# Patient Record
Sex: FEMALE | Race: WHITE | Employment: FULL TIME | URBAN - METROPOLITAN AREA
[De-identification: names, ages, dates, MRNs, and addresses within clinical notes are randomized per-mention and may not be internally consistent; named-entity substitution may affect disease eponyms.]

---

## 2019-06-05 ENCOUNTER — HOSPITAL ENCOUNTER (OUTPATIENT)
Age: 44
Discharge: HOME | End: 2019-06-07
Payer: COMMERCIAL

## 2019-06-05 PROCEDURE — 88305 TISSUE EXAM BY PATHOLOGIST: CPT

## 2020-08-18 NOTE — PROGRESS NOTES
pt. in [place]; unable to come to [facility] in a time for covid test; going to [employer] on [facility]; will fax results to JOHN

## 2020-08-19 RX ORDER — SODIUM CHLORIDE 0.9 % (FLUSH) 0.9 %
10 SYRINGE (ML) INJECTION PRN
Status: CANCELLED | OUTPATIENT
Start: 2020-08-21

## 2020-08-19 RX ORDER — SODIUM CHLORIDE, SODIUM LACTATE, POTASSIUM CHLORIDE, CALCIUM CHLORIDE 600; 310; 30; 20 MG/100ML; MG/100ML; MG/100ML; MG/100ML
INJECTION, SOLUTION INTRAVENOUS CONTINUOUS
Status: CANCELLED | OUTPATIENT
Start: 2020-08-21

## 2020-08-20 ENCOUNTER — ANESTHESIA EVENT (OUTPATIENT)
Dept: OPERATING ROOM | Age: 45
End: 2020-08-20
Payer: COMMERCIAL

## 2020-08-20 PROBLEM — N92.1 MENOMETRORRHAGIA: Status: ACTIVE | Noted: 2020-08-20

## 2020-08-21 ENCOUNTER — HOSPITAL ENCOUNTER (OUTPATIENT)
Age: 45
Setting detail: OUTPATIENT SURGERY
Discharge: HOME | End: 2020-08-21
Attending: OBSTETRICS & GYNECOLOGY | Admitting: OBSTETRICS & GYNECOLOGY
Payer: COMMERCIAL

## 2020-08-21 ENCOUNTER — ANESTHESIA (OUTPATIENT)
Dept: OPERATING ROOM | Age: 45
End: 2020-08-21
Payer: COMMERCIAL

## 2020-08-21 VITALS
SYSTOLIC BLOOD PRESSURE: 107 MMHG | WEIGHT: 180 LBS | OXYGEN SATURATION: 100 % | DIASTOLIC BLOOD PRESSURE: 67 MMHG | BODY MASS INDEX: 28.25 KG/M2 | HEIGHT: 67 IN | HEART RATE: 72 BPM | RESPIRATION RATE: 16 BRPM | TEMPERATURE: 96.9 F

## 2020-08-21 VITALS
SYSTOLIC BLOOD PRESSURE: 93 MMHG | OXYGEN SATURATION: 98 % | RESPIRATION RATE: 3 BRPM | DIASTOLIC BLOOD PRESSURE: 55 MMHG

## 2020-08-21 LAB
HCG QUALITATIVE: NEGATIVE
HCT VFR BLD CALC: 35.8 % (ref 34–48)
HEMOGLOBIN: 11.4 G/DL (ref 11.5–15.5)
MCH RBC QN AUTO: 29.2 PG (ref 26–35)
MCHC RBC AUTO-ENTMCNC: 31.8 % (ref 32–34.5)
MCV RBC AUTO: 91.6 FL (ref 80–99.9)
PDW BLD-RTO: 13.1 FL (ref 11.5–15)
PLATELET # BLD: 233 E9/L (ref 130–450)
PMV BLD AUTO: 10.7 FL (ref 7–12)
RBC # BLD: 3.91 E12/L (ref 3.5–5.5)
WBC # BLD: 6.1 E9/L (ref 4.5–11.5)

## 2020-08-21 PROCEDURE — 2580000003 HC RX 258: Performed by: OBSTETRICS & GYNECOLOGY

## 2020-08-21 PROCEDURE — 7100000001 HC PACU RECOVERY - ADDTL 15 MIN: Performed by: OBSTETRICS & GYNECOLOGY

## 2020-08-21 PROCEDURE — 3600000003 HC SURGERY LEVEL 3 BASE: Performed by: OBSTETRICS & GYNECOLOGY

## 2020-08-21 PROCEDURE — 3600000013 HC SURGERY LEVEL 3 ADDTL 15MIN: Performed by: OBSTETRICS & GYNECOLOGY

## 2020-08-21 PROCEDURE — 2709999900 HC NON-CHARGEABLE SUPPLY: Performed by: OBSTETRICS & GYNECOLOGY

## 2020-08-21 PROCEDURE — 85027 COMPLETE CBC AUTOMATED: CPT

## 2020-08-21 PROCEDURE — 3700000000 HC ANESTHESIA ATTENDED CARE: Performed by: OBSTETRICS & GYNECOLOGY

## 2020-08-21 PROCEDURE — 88305 TISSUE EXAM BY PATHOLOGIST: CPT

## 2020-08-21 PROCEDURE — 6360000002 HC RX W HCPCS

## 2020-08-21 PROCEDURE — 7100000011 HC PHASE II RECOVERY - ADDTL 15 MIN: Performed by: OBSTETRICS & GYNECOLOGY

## 2020-08-21 PROCEDURE — 3700000001 HC ADD 15 MINUTES (ANESTHESIA): Performed by: OBSTETRICS & GYNECOLOGY

## 2020-08-21 PROCEDURE — 36415 COLL VENOUS BLD VENIPUNCTURE: CPT

## 2020-08-21 PROCEDURE — 7100000000 HC PACU RECOVERY - FIRST 15 MIN: Performed by: OBSTETRICS & GYNECOLOGY

## 2020-08-21 PROCEDURE — 84703 CHORIONIC GONADOTROPIN ASSAY: CPT

## 2020-08-21 PROCEDURE — 7100000010 HC PHASE II RECOVERY - FIRST 15 MIN: Performed by: OBSTETRICS & GYNECOLOGY

## 2020-08-21 RX ORDER — SODIUM CHLORIDE 0.9 % (FLUSH) 0.9 %
10 SYRINGE (ML) INJECTION EVERY 12 HOURS SCHEDULED
Status: CANCELLED | OUTPATIENT
Start: 2020-08-21

## 2020-08-21 RX ORDER — SODIUM CHLORIDE 0.9 % (FLUSH) 0.9 %
10 SYRINGE (ML) INJECTION PRN
Status: DISCONTINUED | OUTPATIENT
Start: 2020-08-21 | End: 2020-08-21 | Stop reason: HOSPADM

## 2020-08-21 RX ORDER — ACETAMINOPHEN 325 MG/1
650 TABLET ORAL EVERY 4 HOURS PRN
Status: CANCELLED | OUTPATIENT
Start: 2020-08-21

## 2020-08-21 RX ORDER — OXYCODONE HYDROCHLORIDE AND ACETAMINOPHEN 5; 325 MG/1; MG/1
2 TABLET ORAL EVERY 4 HOURS PRN
Status: CANCELLED | OUTPATIENT
Start: 2020-08-21

## 2020-08-21 RX ORDER — MEPERIDINE HYDROCHLORIDE 25 MG/ML
12.5 INJECTION INTRAMUSCULAR; INTRAVENOUS; SUBCUTANEOUS EVERY 5 MIN PRN
Status: DISCONTINUED | OUTPATIENT
Start: 2020-08-21 | End: 2020-08-21 | Stop reason: HOSPADM

## 2020-08-21 RX ORDER — ONDANSETRON 2 MG/ML
INJECTION INTRAMUSCULAR; INTRAVENOUS PRN
Status: DISCONTINUED | OUTPATIENT
Start: 2020-08-21 | End: 2020-08-21 | Stop reason: SDUPTHER

## 2020-08-21 RX ORDER — MIDAZOLAM HYDROCHLORIDE 1 MG/ML
INJECTION INTRAMUSCULAR; INTRAVENOUS PRN
Status: DISCONTINUED | OUTPATIENT
Start: 2020-08-21 | End: 2020-08-21 | Stop reason: SDUPTHER

## 2020-08-21 RX ORDER — FENTANYL CITRATE 50 UG/ML
INJECTION, SOLUTION INTRAMUSCULAR; INTRAVENOUS PRN
Status: DISCONTINUED | OUTPATIENT
Start: 2020-08-21 | End: 2020-08-21 | Stop reason: SDUPTHER

## 2020-08-21 RX ORDER — SODIUM CHLORIDE 0.9 % (FLUSH) 0.9 %
10 SYRINGE (ML) INJECTION EVERY 12 HOURS SCHEDULED
Status: DISCONTINUED | OUTPATIENT
Start: 2020-08-21 | End: 2020-08-21 | Stop reason: HOSPADM

## 2020-08-21 RX ORDER — IBUPROFEN 600 MG/1
600 TABLET ORAL EVERY 6 HOURS PRN
Status: CANCELLED | OUTPATIENT
Start: 2020-08-21

## 2020-08-21 RX ORDER — SODIUM CHLORIDE 0.9 % (FLUSH) 0.9 %
10 SYRINGE (ML) INJECTION PRN
Status: CANCELLED | OUTPATIENT
Start: 2020-08-21

## 2020-08-21 RX ORDER — PROPOFOL 10 MG/ML
INJECTION, EMULSION INTRAVENOUS PRN
Status: DISCONTINUED | OUTPATIENT
Start: 2020-08-21 | End: 2020-08-21 | Stop reason: SDUPTHER

## 2020-08-21 RX ORDER — OXYCODONE HYDROCHLORIDE AND ACETAMINOPHEN 5; 325 MG/1; MG/1
1 TABLET ORAL EVERY 4 HOURS PRN
Status: CANCELLED | OUTPATIENT
Start: 2020-08-21

## 2020-08-21 RX ORDER — DEXAMETHASONE SODIUM PHOSPHATE 10 MG/ML
INJECTION, SOLUTION INTRAMUSCULAR; INTRAVENOUS PRN
Status: DISCONTINUED | OUTPATIENT
Start: 2020-08-21 | End: 2020-08-21 | Stop reason: SDUPTHER

## 2020-08-21 RX ORDER — LIDOCAINE HYDROCHLORIDE 20 MG/ML
INJECTION, SOLUTION INTRAVENOUS PRN
Status: DISCONTINUED | OUTPATIENT
Start: 2020-08-21 | End: 2020-08-21 | Stop reason: SDUPTHER

## 2020-08-21 RX ORDER — SODIUM CHLORIDE, SODIUM LACTATE, POTASSIUM CHLORIDE, CALCIUM CHLORIDE 600; 310; 30; 20 MG/100ML; MG/100ML; MG/100ML; MG/100ML
INJECTION, SOLUTION INTRAVENOUS CONTINUOUS
Status: DISCONTINUED | OUTPATIENT
Start: 2020-08-21 | End: 2020-08-21 | Stop reason: HOSPADM

## 2020-08-21 RX ADMIN — MIDAZOLAM 2 MG: 1 INJECTION INTRAMUSCULAR; INTRAVENOUS at 07:04

## 2020-08-21 RX ADMIN — FENTANYL CITRATE 50 MCG: 50 INJECTION, SOLUTION INTRAMUSCULAR; INTRAVENOUS at 07:17

## 2020-08-21 RX ADMIN — LIDOCAINE HYDROCHLORIDE 80 MG: 20 INJECTION, SOLUTION INTRAVENOUS at 07:08

## 2020-08-21 RX ADMIN — FENTANYL CITRATE 50 MCG: 50 INJECTION, SOLUTION INTRAMUSCULAR; INTRAVENOUS at 07:08

## 2020-08-21 RX ADMIN — ONDANSETRON 4 MG: 2 INJECTION INTRAMUSCULAR; INTRAVENOUS at 07:20

## 2020-08-21 RX ADMIN — PROPOFOL 200 MG: 10 INJECTION, EMULSION INTRAVENOUS at 07:08

## 2020-08-21 RX ADMIN — DEXAMETHASONE SODIUM PHOSPHATE 10 MG: 10 INJECTION, SOLUTION INTRAMUSCULAR; INTRAVENOUS at 07:08

## 2020-08-21 RX ADMIN — SODIUM CHLORIDE, POTASSIUM CHLORIDE, SODIUM LACTATE AND CALCIUM CHLORIDE: 600; 310; 30; 20 INJECTION, SOLUTION INTRAVENOUS at 05:55

## 2020-08-21 ASSESSMENT — PULMONARY FUNCTION TESTS
PIF_VALUE: 13
PIF_VALUE: 1
PIF_VALUE: 15
PIF_VALUE: 1
PIF_VALUE: 1
PIF_VALUE: 13
PIF_VALUE: 13
PIF_VALUE: 17
PIF_VALUE: 13
PIF_VALUE: 15
PIF_VALUE: 1
PIF_VALUE: 24
PIF_VALUE: 2
PIF_VALUE: 14
PIF_VALUE: 12
PIF_VALUE: 2
PIF_VALUE: 1
PIF_VALUE: 13
PIF_VALUE: 1
PIF_VALUE: 19
PIF_VALUE: 14
PIF_VALUE: 1
PIF_VALUE: 13
PIF_VALUE: 13
PIF_VALUE: 10
PIF_VALUE: 3
PIF_VALUE: 0
PIF_VALUE: 2
PIF_VALUE: 12
PIF_VALUE: 12
PIF_VALUE: 15

## 2020-08-21 ASSESSMENT — PAIN DESCRIPTION - PAIN TYPE
TYPE: SURGICAL PAIN
TYPE: SURGICAL PAIN

## 2020-08-21 ASSESSMENT — PAIN - FUNCTIONAL ASSESSMENT: PAIN_FUNCTIONAL_ASSESSMENT: 0-10

## 2020-08-21 ASSESSMENT — PAIN DESCRIPTION - LOCATION: LOCATION: VAGINA

## 2020-08-21 ASSESSMENT — PAIN SCALES - GENERAL
PAINLEVEL_OUTOF10: 0

## 2020-08-21 NOTE — ANESTHESIA PRE PROCEDURE
Department of Anesthesiology  Preprocedure Note       Name:  Al Mcduffie   Age:  39 y.o.  :  1975                                          MRN:  [id number]         Date:  2020      Surgeon: Alexa Stark):  Stan Padilla MD    Procedure: Procedure(s):  DILATATION AND CURETTAGE HYSTEROSCOPY    Medications prior to admission:   Prior to Admission medications    Medication Sig Start Date End Date Taking? Authorizing Provider   SYNTHROID 25 MCG tablet Take 25 mcg by mouth Daily  19   Historical Provider, MD   metFORMIN (GLUCOPHAGE-XR) 500 MG extended release tablet Take 500 mg by mouth daily (with breakfast)  19   Historical Provider, MD   Cholecalciferol (VITAMIN D3) 5000 units TABS Take by mouth daily     Historical Provider, MD       Current medications:    Current Facility-Administered Medications   Medication Dose Route Frequency Provider Last Rate Last Dose    lactated ringers infusion   Intravenous Continuous Wanda Quinones MD        sodium chloride flush 0.9 % injection 10 mL  10 mL Intravenous 2 times per day Wanda Quinones MD        sodium chloride flush 0.9 % injection 10 mL  10 mL Intravenous PRN Wanda Quinones MD           Allergies:     Allergies   Allergen Reactions    Naproxen      arrythmia       Problem List:    Patient Active Problem List   Diagnosis Code    Menometrorrhagia N92.1       Past Medical History:        Diagnosis Date    Atypical migraine     Fibromyalgia     Goiter     Menometrorrhagia 2020    PCOS (polycystic ovarian syndrome)        Past Surgical History:        Procedure Laterality Date    BLADDER REPAIR      BREAST SURGERY  2018    breast ductal excision       Social History:    Social History     Tobacco Use    Smoking status: Never Smoker    Smokeless tobacco: Never Used   Substance Use Topics    Alcohol use: Never     Frequency: Never                                Counseling given: Not Answered      Vital Signs (Current): Vitals:    08/19/20 1138 08/21/20 0530   BP:  103/65   Pulse:  72   Resp:  16   Temp:  98.2 °F (36.8 °C)   TempSrc:  Temporal   SpO2:  98%   Weight: 180 lb (81.6 kg)    Height: 5' 7\" (1.702 m)                                               BP Readings from Last 3 Encounters:   08/21/20 103/65   06/14/19 128/80   06/05/19 128/80       NPO Status: Time of last liquid consumption: 1900                        Time of last solid consumption: 1800                        Date of last liquid consumption: 08/20/20                        Date of last solid food consumption: 08/20/20    BMI:   Wt Readings from Last 3 Encounters:   08/19/20 180 lb (81.6 kg)   06/14/19 179 lb (81.2 kg)   06/05/19 179 lb (81.2 kg)     Body mass index is 28.19 kg/m². CBC:   Lab Results   Component Value Date    WBC 6.1 08/21/2020    RBC 3.91 08/21/2020    RBC 4.18 05/31/2019    HGB 11.4 08/21/2020    HCT 35.8 08/21/2020    MCV 91.6 08/21/2020    RDW 13.1 08/21/2020     08/21/2020       CMP: No results found for: NA, K, CL, CO2, BUN, CREATININE, GFRAA, AGRATIO, LABGLOM, GLUCOSE, PROT, CALCIUM, BILITOT, ALKPHOS, AST, ALT    POC Tests: No results for input(s): POCGLU, POCNA, POCK, POCCL, POCBUN, POCHEMO, POCHCT in the last 72 hours.     Coags: No results found for: PROTIME, INR, APTT    HCG (If Applicable):   Lab Results   Component Value Date    PREGTESTUR neg 06/06/2019    HCG <5 05/31/2019        ABGs: No results found for: PHART, PO2ART, WIO0UBM, XPE8NDG, BEART, R7QAXPEX     Type & Screen (If Applicable):  No results found for: LABABO, LABRH    Drug/Infectious Status (If Applicable):  No results found for: HIV, HEPCAB    COVID-19 Screening (If Applicable): No results found for: COVID19      Anesthesia Evaluation  Patient summary reviewed  Airway: Mallampati: II  TM distance: >3 FB     Mouth opening: > = 3 FB Dental:          Pulmonary:Negative Pulmonary ROS breath sounds clear to auscultation

## 2020-08-21 NOTE — H&P
nasal congestion  Hematological and Lymphatic ROS: negative for - blood clots, blood transfusions, bruising or fatigue  Endocrine ROS: negative for - malaise/lethargy, mood swings, palpitations or polydipsia/polyuria  Breast ROS: negative for - new or changing breast lumps or nipple changes  Respiratory ROS: negative for - sputum changes, stridor, tachypnea or wheezing  Cardiovascular ROS: negative for - irregular heartbeat, loss of consciousness, murmur or orthopnea  Gastrointestinal ROS: negative for - constipation, diarrhea, gas/bloating, heartburn or hematemesis  Genito-Urinary ROS: negative for -  genital discharge, genital ulcers or hematuria  Musculoskeletal ROS: negative for - gait disturbance, muscle pain or muscular weakness       PHYSICAL EXAM:    Vitals:  BP 96/66   Pulse 63   Temp 97.8 °F (36.6 °C) (Temporal)   Resp 9   Ht 5' 7\" (1.702 m)   Wt 180 lb (81.6 kg)   LMP 08/14/2020   SpO2 97%   BMI 28.19 kg/m²     General appearance:  NAD  Pyscho/social: neg for tremors and hallucinations  Head: NCAT, PERRLA, EOMI, red conjunctiva  Neck: supple, no masses  Lungs: CTAB, equal chest rise bilateral  Heart: Reg rate  Abdomen: soft, moderately tender in RUQ, nondistended  Skin; no lesions  Gu: no cva tenderness  Extremities: extremities normal, atraumatic, no cyanosis or edema    External Genitalia: General appearance; normal, Hair distribution; normal, Lesions absent  Urethral Meatus: Size normal, Location normal, Lesions absent, Prolapse absent  Vagina: General appearance normal, Estrogen effect normal, Discharge absent, Lesions absent, Pelvic support normal  Cervix: General appearance normal, Lesions absent, Discharge absent, Tenderness absent, Enlargement absent, Nodularity absent  Uterus:  Size normal, Contour normal, Position normal  Adenexa:  Masses absent, Tenderness absent    DATA:  Labs:    CBC:   Lab Results   Component Value Date    WBC 6.1 08/21/2020    RBC 3.91 08/21/2020    RBC 4.18 05/31/2019    HGB 11.4 08/21/2020    HCT 35.8 08/21/2020    MCV 91.6 08/21/2020    RDW 13.1 08/21/2020     08/21/2020       IMPRESSION/RECOMMENDATIONS:      Principal Problem:    Menometrorrhagia  Plan: Hysteroscopy D&C      The patient was counseled at length about the risks of mekhi Covid-19 during their perioperative period and any recovery window from their procedure. The patient was made aware that mekhi Covid-19  may worsen their prognosis for recovering from their procedure  and lend to a higher morbidity and/or mortality risk. All material risks, benefits, and reasonable alternatives including postponing the procedure were discussed. The patient does wish to proceed with the procedure at this time.

## 2020-08-21 NOTE — OP NOTE
PATIENT NAME:    Mariana Barrera    DATE OF PROCEDURE:                      8/21/2020  SURGEON:                                            Dani Westbrook M.D.   ASSISTANT:   PREOPERATIVE DIAGNOSIS:              Menometrorrhagia  POSTOPERATIVE DIAGNOSIS:            Same   OPERATION:                                          Hysteroscopy and D&C. ANESTHESIA:                                         General.   ESTIMATED BLOOD LOSS:                Minimal.   COMPLICATIONS:                                  None. PROCEDURE NOTE: With the patient in the supine position, general anesthesia was administered without any complications. The patient was then placed in dorsal lithotomy position using cane stirrups. The perineum was scrubbed and draped in the usual fashion. The bladder was catheterized, and clear looking urine was recovered. A heavy weighted retractor was placed in the vagina. The anterior lip of the cervix was grabbed by single-tooth tenaculum. The cervical os was dilated to allow the position of #16 Yudy Solid without any difficulty. A 5.5 mm diagnostic hysteroscope was introduced into the uterine cavity, and using normal saline as distending medium, diagnostic hysteroscopy was carried out. Thickened endometrium and normal tubal ostia were seen. The hysteroscope was then withdrawn. Next, sharp curettage of the endometrium was performed, and all tissues were submitted to pathology. Inspection revealed excellent hemostasis. The procedure was then terminated. All instruments were removed. The patient was placed in supine position, awakened from anesthesia and transferred to recovery room in good stable condition.        Active Hospital Problems    Diagnosis Date Noted    Menometrorrhagia [N92.1] 08/20/2020         Wanda Quinones  8/21/2020  7:57 AM

## 2020-08-21 NOTE — ANESTHESIA POSTPROCEDURE EVALUATION
Department of Anesthesiology  Postprocedure Note    Patient: Cristo Hannah  MRN: [id number]  YOB: 1975  Date of evaluation: 8/21/2020  Time:  9:24 AM     Procedure Summary     Date:  08/21/20 Room / Location:  [address] [place] [address] 03 / [address]    Anesthesia Start:  0703 Anesthesia Stop:  1717    Procedure:  DILATATION AND CURETTAGE HYSTEROSCOPY (N/A Uterus) Diagnosis:  (MENOMETRORRHAGIA)    Surgeon:  Chong Gonzalez MD Responsible Provider:  Estrella Hi MD    Anesthesia Type:  general ASA Status:  3          Anesthesia Type: general    Hung Phase I: Hung Score: 10    Hung Phase II: Hung Score: 10    Last vitals: Reviewed and per EMR flowsheets.        Anesthesia Post Evaluation    Patient location during evaluation: PACU  Patient participation: complete - patient participated  Level of consciousness: awake  Pain score: 0  Airway patency: patent  Nausea & Vomiting: no nausea  Complications: no  Cardiovascular status: blood pressure returned to baseline  Respiratory status: acceptable  Hydration status: euvolemic

## 2020-10-05 ENCOUNTER — HOSPITAL ENCOUNTER (OUTPATIENT)
Dept: PREADMISSION TESTING | Age: 45
Discharge: HOME | End: 2020-10-05
Payer: COMMERCIAL

## 2020-10-05 ENCOUNTER — HOSPITAL ENCOUNTER (OUTPATIENT)
Age: 45
Discharge: HOME | End: 2020-10-07
Payer: COMMERCIAL

## 2020-10-05 ENCOUNTER — HOSPITAL ENCOUNTER (OUTPATIENT)
Age: 45
Discharge: HOME | End: 2020-10-05
Payer: COMMERCIAL

## 2020-10-05 VITALS
HEIGHT: 67 IN | DIASTOLIC BLOOD PRESSURE: 80 MMHG | TEMPERATURE: 97.8 F | HEART RATE: 89 BPM | BODY MASS INDEX: 29.57 KG/M2 | RESPIRATION RATE: 16 BRPM | WEIGHT: 188.44 LBS | OXYGEN SATURATION: 97 % | SYSTOLIC BLOOD PRESSURE: 124 MMHG

## 2020-10-05 LAB
ABO/RH: NORMAL
ANTIBODY SCREEN: NORMAL
HCT VFR BLD CALC: 37.8 % (ref 34–48)
HEMOGLOBIN: 12.1 G/DL (ref 11.5–15.5)
MCH RBC QN AUTO: 29.2 PG (ref 26–35)
MCHC RBC AUTO-ENTMCNC: 32 % (ref 32–34.5)
MCV RBC AUTO: 91.1 FL (ref 80–99.9)
PDW BLD-RTO: 12.9 FL (ref 11.5–15)
PLATELET # BLD: 245 E9/L (ref 130–450)
PMV BLD AUTO: 10.7 FL (ref 7–12)
RBC # BLD: 4.15 E12/L (ref 3.5–5.5)
WBC # BLD: 7.2 E9/L (ref 4.5–11.5)

## 2020-10-05 PROCEDURE — 86850 RBC ANTIBODY SCREEN: CPT

## 2020-10-05 PROCEDURE — 86900 BLOOD TYPING SEROLOGIC ABO: CPT

## 2020-10-05 PROCEDURE — 36415 COLL VENOUS BLD VENIPUNCTURE: CPT

## 2020-10-05 PROCEDURE — 86901 BLOOD TYPING SEROLOGIC RH(D): CPT

## 2020-10-05 PROCEDURE — U0003 INFECTIOUS AGENT DETECTION BY NUCLEIC ACID (DNA OR RNA); SEVERE ACUTE RESPIRATORY SYNDROME CORONAVIRUS 2 (SARS-COV-2) (CORONAVIRUS DISEASE [COVID-19]), AMPLIFIED PROBE TECHNIQUE, MAKING USE OF HIGH THROUGHPUT TECHNOLOGIES AS DESCRIBED BY CMS-2020-01-R: HCPCS

## 2020-10-05 PROCEDURE — 85027 COMPLETE CBC AUTOMATED: CPT

## 2020-10-05 RX ORDER — ONABOTULINUMTOXINA 200 [USP'U]/1
200 INJECTION, POWDER, LYOPHILIZED, FOR SOLUTION INTRADERMAL; INTRAMUSCULAR
COMMUNITY
Start: 2020-09-09

## 2020-10-05 NOTE — PROGRESS NOTES
nail polish on your fingers or toes. 11. DO NOT wear any jewelry or piercings on day of surgery. All body piercing jewelry must be removed. 12. Shower the night before surgery with _x__Antibacterial soap /MARK WIPES________    13. TOTAL JOINT REPLACEMENT/HYSTERECTOMY PATIENTS ONLY---Remember to bring Blood Bank bracelet to the hospital on the day of surgery. 14. If you have a Living Will and Durable Power of  for Healthcare, please bring in a copy. 15. If appropriate bring crutches, inspirex, WALKER, CANE etc... 12. Notify your Surgeon if you develop any illness between now and surgery time, cough, cold, fever, sore throat, nausea, vomiting, etc.  Please notify your surgeon if you experience dizziness, shortness of breath or blurred vision between now & the time of your surgery. 17. If you have ___dentures, they will be removed before going to the OR; we will provide you a container. If you wear ___contact lenses or ___glasses, they will be removed; please bring a case for them. 18. To provide excellent care visitors will be limited to 2 in the room at any given time. 19. Please bring picture ID and insurance card. 20. Sleep apnea patients need to bring CPAP AND SETTINGS to hospital on day of surgery. 21. During flu season no children under the age of 15 are permitted in the hospital for the safety of all patients. 22. Other come in main lobby and stop at                  Please call AMBULATORY CARE if you have any further questions.    [facility]     [address]

## 2020-10-07 LAB
SARS-COV-2: NOT DETECTED
SOURCE: NORMAL

## 2020-10-09 ENCOUNTER — ANESTHESIA EVENT (OUTPATIENT)
Dept: OPERATING ROOM | Age: 45
End: 2020-10-09
Payer: COMMERCIAL

## 2020-10-09 ENCOUNTER — HOSPITAL ENCOUNTER (OUTPATIENT)
Age: 45
Setting detail: OBSERVATION
Discharge: HOME | End: 2020-10-09
Attending: OBSTETRICS & GYNECOLOGY | Admitting: OBSTETRICS & GYNECOLOGY
Payer: COMMERCIAL

## 2020-10-09 ENCOUNTER — ANESTHESIA (OUTPATIENT)
Dept: OPERATING ROOM | Age: 45
End: 2020-10-09
Payer: COMMERCIAL

## 2020-10-09 VITALS
DIASTOLIC BLOOD PRESSURE: 89 MMHG | SYSTOLIC BLOOD PRESSURE: 127 MMHG | RESPIRATION RATE: 2 BRPM | OXYGEN SATURATION: 100 % | TEMPERATURE: 95 F

## 2020-10-09 VITALS
SYSTOLIC BLOOD PRESSURE: 116 MMHG | DIASTOLIC BLOOD PRESSURE: 74 MMHG | RESPIRATION RATE: 15 BRPM | OXYGEN SATURATION: 99 % | HEART RATE: 91 BPM | TEMPERATURE: 98.5 F

## 2020-10-09 PROBLEM — Z98.890 POST-OPERATIVE STATE: Status: ACTIVE | Noted: 2020-10-09

## 2020-10-09 PROBLEM — Z90.710 HISTORY OF ROBOT-ASSISTED LAPAROSCOPIC HYSTERECTOMY: Status: ACTIVE | Noted: 2020-10-09

## 2020-10-09 PROBLEM — N85.2 HYPERTROPHY OF UTERUS: Status: ACTIVE | Noted: 2020-10-09

## 2020-10-09 LAB
HCG(URINE) PREGNANCY TEST: NEGATIVE
METER GLUCOSE: 100 MG/DL (ref 74–99)

## 2020-10-09 PROCEDURE — 3700000001 HC ADD 15 MINUTES (ANESTHESIA): Performed by: OBSTETRICS & GYNECOLOGY

## 2020-10-09 PROCEDURE — 88341 IMHCHEM/IMCYTCHM EA ADD ANTB: CPT

## 2020-10-09 PROCEDURE — S2900 ROBOTIC SURGICAL SYSTEM: HCPCS | Performed by: OBSTETRICS & GYNECOLOGY

## 2020-10-09 PROCEDURE — G0378 HOSPITAL OBSERVATION PER HR: HCPCS

## 2020-10-09 PROCEDURE — 81025 URINE PREGNANCY TEST: CPT

## 2020-10-09 PROCEDURE — 88342 IMHCHEM/IMCYTCHM 1ST ANTB: CPT

## 2020-10-09 PROCEDURE — 2500000003 HC RX 250 WO HCPCS: Performed by: NURSE ANESTHETIST, CERTIFIED REGISTERED

## 2020-10-09 PROCEDURE — 3700000000 HC ANESTHESIA ATTENDED CARE: Performed by: OBSTETRICS & GYNECOLOGY

## 2020-10-09 PROCEDURE — 2780000010 HC IMPLANT OTHER: Performed by: OBSTETRICS & GYNECOLOGY

## 2020-10-09 PROCEDURE — 7100000001 HC PACU RECOVERY - ADDTL 15 MIN: Performed by: OBSTETRICS & GYNECOLOGY

## 2020-10-09 PROCEDURE — 2580000003 HC RX 258: Performed by: OBSTETRICS & GYNECOLOGY

## 2020-10-09 PROCEDURE — 6360000002 HC RX W HCPCS: Performed by: NURSE ANESTHETIST, CERTIFIED REGISTERED

## 2020-10-09 PROCEDURE — 2709999900 HC NON-CHARGEABLE SUPPLY: Performed by: OBSTETRICS & GYNECOLOGY

## 2020-10-09 PROCEDURE — 7100000000 HC PACU RECOVERY - FIRST 15 MIN: Performed by: OBSTETRICS & GYNECOLOGY

## 2020-10-09 PROCEDURE — 2720000010 HC SURG SUPPLY STERILE: Performed by: OBSTETRICS & GYNECOLOGY

## 2020-10-09 PROCEDURE — 6360000002 HC RX W HCPCS: Performed by: OBSTETRICS & GYNECOLOGY

## 2020-10-09 PROCEDURE — 82962 GLUCOSE BLOOD TEST: CPT

## 2020-10-09 PROCEDURE — 6360000002 HC RX W HCPCS

## 2020-10-09 PROCEDURE — 3600000019 HC SURGERY ROBOT ADDTL 15MIN: Performed by: OBSTETRICS & GYNECOLOGY

## 2020-10-09 PROCEDURE — 3600000009 HC SURGERY ROBOT BASE: Performed by: OBSTETRICS & GYNECOLOGY

## 2020-10-09 PROCEDURE — 6370000000 HC RX 637 (ALT 250 FOR IP): Performed by: OBSTETRICS & GYNECOLOGY

## 2020-10-09 PROCEDURE — 88307 TISSUE EXAM BY PATHOLOGIST: CPT

## 2020-10-09 RX ORDER — PROPOFOL 10 MG/ML
INJECTION, EMULSION INTRAVENOUS PRN
Status: DISCONTINUED | OUTPATIENT
Start: 2020-10-09 | End: 2020-10-09 | Stop reason: SDUPTHER

## 2020-10-09 RX ORDER — SODIUM CHLORIDE 0.9 % (FLUSH) 0.9 %
10 SYRINGE (ML) INJECTION PRN
Status: DISCONTINUED | OUTPATIENT
Start: 2020-10-09 | End: 2020-10-10 | Stop reason: HOSPADM

## 2020-10-09 RX ORDER — LIDOCAINE HYDROCHLORIDE 20 MG/ML
INJECTION, SOLUTION INTRAVENOUS PRN
Status: DISCONTINUED | OUTPATIENT
Start: 2020-10-09 | End: 2020-10-09 | Stop reason: SDUPTHER

## 2020-10-09 RX ORDER — ONDANSETRON 2 MG/ML
4 INJECTION INTRAMUSCULAR; INTRAVENOUS
Status: DISCONTINUED | OUTPATIENT
Start: 2020-10-09 | End: 2020-10-09 | Stop reason: HOSPADM

## 2020-10-09 RX ORDER — GLYCOPYRROLATE 1 MG/5 ML
SYRINGE (ML) INTRAVENOUS PRN
Status: DISCONTINUED | OUTPATIENT
Start: 2020-10-09 | End: 2020-10-09 | Stop reason: SDUPTHER

## 2020-10-09 RX ORDER — SODIUM CHLORIDE 0.9 % (FLUSH) 0.9 %
10 SYRINGE (ML) INJECTION EVERY 12 HOURS SCHEDULED
Status: DISCONTINUED | OUTPATIENT
Start: 2020-10-09 | End: 2020-10-10 | Stop reason: HOSPADM

## 2020-10-09 RX ORDER — ONDANSETRON 2 MG/ML
INJECTION INTRAMUSCULAR; INTRAVENOUS PRN
Status: DISCONTINUED | OUTPATIENT
Start: 2020-10-09 | End: 2020-10-09 | Stop reason: SDUPTHER

## 2020-10-09 RX ORDER — MEPERIDINE HYDROCHLORIDE 25 MG/ML
12.5 INJECTION INTRAMUSCULAR; INTRAVENOUS; SUBCUTANEOUS EVERY 5 MIN PRN
Status: DISCONTINUED | OUTPATIENT
Start: 2020-10-09 | End: 2020-10-09 | Stop reason: HOSPADM

## 2020-10-09 RX ORDER — MEPERIDINE HYDROCHLORIDE 25 MG/ML
INJECTION INTRAMUSCULAR; INTRAVENOUS; SUBCUTANEOUS
Status: COMPLETED
Start: 2020-10-09 | End: 2020-10-09

## 2020-10-09 RX ORDER — KETOROLAC TROMETHAMINE 30 MG/ML
INJECTION, SOLUTION INTRAMUSCULAR; INTRAVENOUS PRN
Status: DISCONTINUED | OUTPATIENT
Start: 2020-10-09 | End: 2020-10-09 | Stop reason: SDUPTHER

## 2020-10-09 RX ORDER — SODIUM CHLORIDE 0.9 % (FLUSH) 0.9 %
10 SYRINGE (ML) INJECTION PRN
Status: DISCONTINUED | OUTPATIENT
Start: 2020-10-09 | End: 2020-10-09 | Stop reason: HOSPADM

## 2020-10-09 RX ORDER — SODIUM CHLORIDE 0.9 % (FLUSH) 0.9 %
10 SYRINGE (ML) INJECTION EVERY 12 HOURS SCHEDULED
Status: DISCONTINUED | OUTPATIENT
Start: 2020-10-09 | End: 2020-10-09 | Stop reason: HOSPADM

## 2020-10-09 RX ORDER — ROCURONIUM BROMIDE 10 MG/ML
INJECTION, SOLUTION INTRAVENOUS PRN
Status: DISCONTINUED | OUTPATIENT
Start: 2020-10-09 | End: 2020-10-09 | Stop reason: SDUPTHER

## 2020-10-09 RX ORDER — OXYCODONE HYDROCHLORIDE AND ACETAMINOPHEN 5; 325 MG/1; MG/1
1 TABLET ORAL EVERY 4 HOURS PRN
Status: DISCONTINUED | OUTPATIENT
Start: 2020-10-09 | End: 2020-10-10 | Stop reason: HOSPADM

## 2020-10-09 RX ORDER — NEOSTIGMINE METHYLSULFATE 1 MG/ML
INJECTION, SOLUTION INTRAVENOUS PRN
Status: DISCONTINUED | OUTPATIENT
Start: 2020-10-09 | End: 2020-10-09 | Stop reason: SDUPTHER

## 2020-10-09 RX ORDER — MIDAZOLAM HYDROCHLORIDE 1 MG/ML
INJECTION INTRAMUSCULAR; INTRAVENOUS PRN
Status: DISCONTINUED | OUTPATIENT
Start: 2020-10-09 | End: 2020-10-09 | Stop reason: SDUPTHER

## 2020-10-09 RX ORDER — FENTANYL CITRATE 50 UG/ML
INJECTION, SOLUTION INTRAMUSCULAR; INTRAVENOUS PRN
Status: DISCONTINUED | OUTPATIENT
Start: 2020-10-09 | End: 2020-10-09 | Stop reason: SDUPTHER

## 2020-10-09 RX ORDER — SODIUM CHLORIDE, SODIUM LACTATE, POTASSIUM CHLORIDE, CALCIUM CHLORIDE 600; 310; 30; 20 MG/100ML; MG/100ML; MG/100ML; MG/100ML
INJECTION, SOLUTION INTRAVENOUS CONTINUOUS
Status: DISCONTINUED | OUTPATIENT
Start: 2020-10-09 | End: 2020-10-10 | Stop reason: HOSPADM

## 2020-10-09 RX ORDER — OXYCODONE HYDROCHLORIDE AND ACETAMINOPHEN 5; 325 MG/1; MG/1
2 TABLET ORAL EVERY 4 HOURS PRN
Status: DISCONTINUED | OUTPATIENT
Start: 2020-10-09 | End: 2020-10-10 | Stop reason: HOSPADM

## 2020-10-09 RX ORDER — DEXAMETHASONE SODIUM PHOSPHATE 10 MG/ML
INJECTION, SOLUTION INTRAMUSCULAR; INTRAVENOUS PRN
Status: DISCONTINUED | OUTPATIENT
Start: 2020-10-09 | End: 2020-10-09 | Stop reason: SDUPTHER

## 2020-10-09 RX ORDER — ACETAMINOPHEN 325 MG/1
650 TABLET ORAL EVERY 4 HOURS PRN
Status: DISCONTINUED | OUTPATIENT
Start: 2020-10-09 | End: 2020-10-10 | Stop reason: HOSPADM

## 2020-10-09 RX ADMIN — FENTANYL CITRATE 50 MCG: 50 INJECTION, SOLUTION INTRAMUSCULAR; INTRAVENOUS at 08:30

## 2020-10-09 RX ADMIN — MEPERIDINE HYDROCHLORIDE 12.5 MG: 25 INJECTION INTRAMUSCULAR; INTRAVENOUS; SUBCUTANEOUS at 09:16

## 2020-10-09 RX ADMIN — PROPOFOL 190 MG: 10 INJECTION, EMULSION INTRAVENOUS at 07:19

## 2020-10-09 RX ADMIN — FENTANYL CITRATE 100 MCG: 50 INJECTION, SOLUTION INTRAMUSCULAR; INTRAVENOUS at 07:19

## 2020-10-09 RX ADMIN — FENTANYL CITRATE 50 MCG: 50 INJECTION, SOLUTION INTRAMUSCULAR; INTRAVENOUS at 07:43

## 2020-10-09 RX ADMIN — Medication 2 G: at 07:10

## 2020-10-09 RX ADMIN — SODIUM CHLORIDE, POTASSIUM CHLORIDE, SODIUM LACTATE AND CALCIUM CHLORIDE: 600; 310; 30; 20 INJECTION, SOLUTION INTRAVENOUS at 07:10

## 2020-10-09 RX ADMIN — Medication 0.6 MG: at 08:36

## 2020-10-09 RX ADMIN — MIDAZOLAM 2 MG: 1 INJECTION INTRAMUSCULAR; INTRAVENOUS at 07:09

## 2020-10-09 RX ADMIN — FENTANYL CITRATE 50 MCG: 50 INJECTION, SOLUTION INTRAMUSCULAR; INTRAVENOUS at 08:53

## 2020-10-09 RX ADMIN — ONDANSETRON 4 MG: 2 INJECTION INTRAMUSCULAR; INTRAVENOUS at 08:45

## 2020-10-09 RX ADMIN — ACETAMINOPHEN 650 MG: 325 TABLET, FILM COATED ORAL at 20:27

## 2020-10-09 RX ADMIN — DEXAMETHASONE SODIUM PHOSPHATE 10 MG: 10 INJECTION, SOLUTION INTRAMUSCULAR; INTRAVENOUS at 07:32

## 2020-10-09 RX ADMIN — ROCURONIUM BROMIDE 40 MG: 10 SOLUTION INTRAVENOUS at 07:19

## 2020-10-09 RX ADMIN — SODIUM CHLORIDE, POTASSIUM CHLORIDE, SODIUM LACTATE AND CALCIUM CHLORIDE: 600; 310; 30; 20 INJECTION, SOLUTION INTRAVENOUS at 06:08

## 2020-10-09 RX ADMIN — Medication 3 MG: at 08:36

## 2020-10-09 RX ADMIN — LIDOCAINE HYDROCHLORIDE 60 MG: 20 INJECTION, SOLUTION INTRAVENOUS at 07:19

## 2020-10-09 RX ADMIN — KETOROLAC TROMETHAMINE 30 MG: 30 INJECTION, SOLUTION INTRAMUSCULAR at 08:56

## 2020-10-09 ASSESSMENT — PULMONARY FUNCTION TESTS
PIF_VALUE: 29
PIF_VALUE: 15
PIF_VALUE: 28
PIF_VALUE: 23
PIF_VALUE: 27
PIF_VALUE: 19
PIF_VALUE: 19
PIF_VALUE: 28
PIF_VALUE: 14
PIF_VALUE: 19
PIF_VALUE: 14
PIF_VALUE: 1
PIF_VALUE: 28
PIF_VALUE: 16
PIF_VALUE: 28
PIF_VALUE: 28
PIF_VALUE: 27
PIF_VALUE: 28
PIF_VALUE: 20
PIF_VALUE: 7
PIF_VALUE: 28
PIF_VALUE: 29
PIF_VALUE: 1
PIF_VALUE: 28
PIF_VALUE: 15
PIF_VALUE: 16
PIF_VALUE: 1
PIF_VALUE: 2
PIF_VALUE: 15
PIF_VALUE: 20
PIF_VALUE: 15
PIF_VALUE: 15
PIF_VALUE: 28
PIF_VALUE: 18
PIF_VALUE: 22
PIF_VALUE: 27
PIF_VALUE: 14
PIF_VALUE: 29
PIF_VALUE: 29
PIF_VALUE: 27
PIF_VALUE: 2
PIF_VALUE: 5
PIF_VALUE: 23
PIF_VALUE: 3
PIF_VALUE: 17
PIF_VALUE: 29
PIF_VALUE: 14
PIF_VALUE: 28
PIF_VALUE: 21
PIF_VALUE: 25
PIF_VALUE: 15
PIF_VALUE: 28
PIF_VALUE: 1
PIF_VALUE: 16
PIF_VALUE: 28
PIF_VALUE: 17
PIF_VALUE: 27
PIF_VALUE: 29
PIF_VALUE: 28
PIF_VALUE: 28
PIF_VALUE: 29
PIF_VALUE: 15
PIF_VALUE: 28
PIF_VALUE: 5
PIF_VALUE: 28
PIF_VALUE: 17
PIF_VALUE: 17
PIF_VALUE: 14
PIF_VALUE: 28
PIF_VALUE: 27
PIF_VALUE: 17
PIF_VALUE: 20
PIF_VALUE: 28
PIF_VALUE: 15
PIF_VALUE: 28
PIF_VALUE: 29
PIF_VALUE: 29
PIF_VALUE: 15
PIF_VALUE: 23
PIF_VALUE: 0
PIF_VALUE: 15
PIF_VALUE: 29
PIF_VALUE: 15
PIF_VALUE: 1
PIF_VALUE: 17
PIF_VALUE: 12
PIF_VALUE: 16
PIF_VALUE: 1
PIF_VALUE: 28
PIF_VALUE: 28
PIF_VALUE: 27
PIF_VALUE: 17
PIF_VALUE: 28
PIF_VALUE: 1
PIF_VALUE: 27

## 2020-10-09 ASSESSMENT — PAIN DESCRIPTION - LOCATION
LOCATION: ABDOMEN
LOCATION: ABDOMEN

## 2020-10-09 ASSESSMENT — PAIN SCALES - GENERAL
PAINLEVEL_OUTOF10: 1
PAINLEVEL_OUTOF10: 3
PAINLEVEL_OUTOF10: 4
PAINLEVEL_OUTOF10: 0

## 2020-10-09 ASSESSMENT — PAIN - FUNCTIONAL ASSESSMENT
PAIN_FUNCTIONAL_ASSESSMENT: ACTIVITIES ARE NOT PREVENTED
PAIN_FUNCTIONAL_ASSESSMENT: 0-10

## 2020-10-09 ASSESSMENT — PAIN DESCRIPTION - FREQUENCY: FREQUENCY: CONTINUOUS

## 2020-10-09 ASSESSMENT — PAIN DESCRIPTION - ORIENTATION: ORIENTATION: MID;LOWER

## 2020-10-09 ASSESSMENT — PAIN DESCRIPTION - DESCRIPTORS
DESCRIPTORS: ACHING;DISCOMFORT;SORE
DESCRIPTORS: ACHING;CRAMPING

## 2020-10-09 ASSESSMENT — PAIN DESCRIPTION - ONSET: ONSET: GRADUAL

## 2020-10-09 ASSESSMENT — PAIN DESCRIPTION - PAIN TYPE
TYPE: SURGICAL PAIN
TYPE: SURGICAL PAIN

## 2020-10-09 ASSESSMENT — PAIN DESCRIPTION - PROGRESSION: CLINICAL_PROGRESSION: NOT CHANGED

## 2020-10-09 NOTE — PROGRESS NOTES
Assumed care of pt at this time post-op hysterectomy. Plan of care discussed. Pt verbalized understanding. C/O being slightly nauseated and requesting medication. Call placed to Doctor William. No other concerns expressed. Call light within reach.  Corwithpepe Ozuna at Bedside.

## 2020-10-09 NOTE — PROGRESS NOTES
Dr Manisha Olguin in to patients room patient expess desire to be discharged today. Dr responded it is a possibility after reassessment this evening.

## 2020-10-09 NOTE — OP NOTE
prepared, brought in at a left-side docking, and the robotic arms   were then docked, and then the camera was then docked. At this time, the robotic instruments were then placed. The 1-arm had the ProGrasp. The 3-arm had the vessel sealer , and the 4-arm had the Endo Abby . The surgeon then broke scrub and moved to the surgeon's cart, and a robotic   time-out was then performed. The operation was then begun. Using the vessel sealer, the right round ligament was then coapted and divided, and with the Endo Abby, the round ligament was then divided and the posterior peritoneum was taken down to the pelvic side wall. The retroperitoneum was then developed, and the right ureter was then identified directly in its retroperitoneal space. The right  tubo-ovarian ligament and remainder of the fallopian tube   were then coapted and divided with vessel sealer. The posterior peritoneum was then taken down to the level of the   uterine artery with the Endo Abby. Similar procedure was then performed   on the opposite side. Then on the patient's left a bladder flap was then begun. The anterior peritoneum was then mobilized, and the bladder well mobilized using the Endo Abby. All 3 arms were then used throughout the procedure, and the bladder was well mobilized off the cervix and down approximately 2-3 cm past the cup, and the vagina was well visualized. The   posterior peritoneum was then visualized for both uterine arteries. Both   uterine arteries were then skeletonized. With cephalad traction on the   manipulator, both uterine arteries were then skeletonized, coapted with the   Vessel sealer, and then divided with the Endo Abby. The anterior colpotomy was then performed along with a posterior colpotomy at 3 and 9 o'clock. The remaining portion was then coapted with bipolar energy. The specimen was then freed and then removed through the vagina.  Once the specimen was then freed and removed from the

## 2020-10-09 NOTE — ANESTHESIA PRE PROCEDURE
Department of Anesthesiology  Preprocedure Note       Name:  Francisco Javier Mendez   Age:  39 y.o.  :  1975                                          MRN:  [id number]         Date:  10/9/2020      Surgeon: Lydia Morataya):  Thi Marie MD    Procedure: Procedure(s):  ROBOTIC XI ASSISTED TOTAL LAPAROSCOPIC HYSTERECTOMY WITH BILATERAL SALPINGECTOMY    Medications prior to admission:   Prior to Admission medications    Medication Sig Start Date End Date Taking? Authorizing Provider   Multiple Vitamins-Minerals (MULTIVITAMIN ADULT PO) Take by mouth daily    Historical Provider, MD   BOTOX 200 units injection Inject 200 Units into the skin every 3 months For migraines 20   Historical Provider, MD   SYNTHROID 25 MCG tablet Take 25 mcg by mouth Daily  19   Historical Provider, MD   metFORMIN (GLUCOPHAGE-XR) 500 MG extended release tablet Take 500 mg by mouth daily (with breakfast) For polycystic ovarian disease, not DM 19   Historical Provider, MD   Cholecalciferol (VITAMIN D3) 5000 units TABS Take by mouth daily     Historical Provider, MD       Current medications:    No current facility-administered medications for this visit. No current outpatient medications on file. Facility-Administered Medications Ordered in Other Visits   Medication Dose Route Frequency Provider Last Rate Last Dose    lactated ringers infusion   Intravenous Continuous Amine PATRICIA Quinones  mL/hr at 10/09/20 0608      sodium chloride flush 0.9 % injection 10 mL  10 mL Intravenous 2 times per day Wanda Quinones MD        sodium chloride flush 0.9 % injection 10 mL  10 mL Intravenous PRN Wanda Quinones MD        ceFAZolin (ANCEF) 2 g in sterile water 20 mL IV syringe  2 g Intravenous On Call to MD Luís           Allergies: Allergies   Allergen Reactions    Naproxen      arrythmia    Food      Onions . garlic , severe bloating    Topamax [Topiramate] Other (See Comments)     Breast swelling    Adhesive Tape Rash       Problem List:    Patient Active Problem List   Diagnosis Code    Menometrorrhagia N92.1       Past Medical History:        Diagnosis Date    Atypical migraine     Fibromyalgia     Goiter     [place] of blood transfusion 1979    [place]il    Menometrorrhagia 8/20/2020    PCOS (polycystic ovarian syndrome)        Past Surgical History:        Procedure Laterality Date    BLADDER REPAIR      BREAST SURGERY  2018    breast ductal excision    DILATION AND CURETTAGE OF UTERUS N/A 8/21/2020    DILATATION AND CURETTAGE HYSTEROSCOPY performed by Nereida Siemens, MD at [facility] History:    Social History     Tobacco Use    Smoking status: Never Smoker    Smokeless tobacco: Never Used   Substance Use Topics    Alcohol use: Never     Frequency: Never                                Counseling given: Not Answered      Vital Signs (Current): There were no vitals filed for this visit. BP Readings from Last 3 Encounters:   10/09/20 99/65   10/05/20 124/80   08/21/20 (!) 93/55       NPO Status:                                                                                 BMI:   Wt Readings from Last 3 Encounters:   10/05/20 188 lb 7 oz (85.5 kg)   08/19/20 180 lb (81.6 kg)   06/14/19 179 lb (81.2 kg)     There is no height or weight on file to calculate BMI.    CBC:   Lab Results   Component Value Date    WBC 7.2 10/05/2020    RBC 4.15 10/05/2020    RBC 4.18 05/31/2019    HGB 12.1 10/05/2020    HCT 37.8 10/05/2020    MCV 91.1 10/05/2020    RDW 12.9 10/05/2020     10/05/2020       CMP: No results found for: NA, K, CL, CO2, BUN, CREATININE, GFRAA, AGRATIO, LABGLOM, GLUCOSE, PROT, CALCIUM, BILITOT, ALKPHOS, AST, ALT    POC Tests: No results for input(s): POCGLU, POCNA, POCK, POCCL, POCBUN, POCHEMO, POCHCT in the last 72 hours.     Coags: No results found for: PROTIME, INR, APTT    HCG (If Applicable):   Lab Results   Component Value Date    PREGTESTUR neg 06/06/2019    HCG <5 05/31/2019        ABGs: No results found for: PHART, PO2ART, AAC2SSY, GRP4YCX, BEART, I7VSSHQD     Type & Screen (If Applicable):  No results found for: LABABO, LABRH    Drug/Infectious Status (If Applicable):  No results found for: HIV, HEPCAB    COVID-19 Screening (If Applicable):   Lab Results   Component Value Date    COVID19 Not Detected 10/05/2020         Anesthesia Evaluation  Patient summary reviewed  Airway: Mallampati: II  TM distance: >3 FB     Mouth opening: > = 3 FB Dental:          Pulmonary:Negative Pulmonary ROS breath sounds clear to auscultation                             Cardiovascular:Negative CV ROS            Rhythm: regular  Rate: normal                    Neuro/Psych:   (+) neuromuscular disease:, headaches (Atypical Migraine.): migraine headaches,             GI/Hepatic/Renal: Neg GI/Hepatic/Renal ROS            Endo/Other:    (+) hypothyroidism::., .                  ROS comment: Polycystic ovarian syndrome. S/P Breast Ductal Excision. Goiter. Abdominal:           Vascular: negative vascular ROS. Anesthesia Plan      general     ASA 2       Induction: intravenous. BIS  MIPS: Postoperative opioids intended. Anesthetic plan and risks discussed with patient. Plan discussed with CRNA.                   Linnea Dubois MD   10/9/2020

## 2020-10-10 NOTE — PROGRESS NOTES
Dr. Sis Lewis called in and talked to myself and the patient. Orders received to discharge patient home at this time. Discharge instructions given to pt over the phone by Dr. Mal Michelle understanding.

## 2020-11-08 PROBLEM — Z98.890 POST-OPERATIVE STATE: Status: RESOLVED | Noted: 2020-10-09 | Resolved: 2020-11-08

## 2021-08-27 ENCOUNTER — OFFICE VISIT (OUTPATIENT)
Dept: SURGERY | Age: 46
End: 2021-08-27
Payer: COMMERCIAL

## 2021-08-27 VITALS
DIASTOLIC BLOOD PRESSURE: 82 MMHG | HEIGHT: 67 IN | OXYGEN SATURATION: 98 % | WEIGHT: 206.3 LBS | BODY MASS INDEX: 32.38 KG/M2 | TEMPERATURE: 97.3 F | SYSTOLIC BLOOD PRESSURE: 110 MMHG | HEART RATE: 103 BPM | RESPIRATION RATE: 16 BRPM

## 2021-08-27 DIAGNOSIS — G43.919 INTRACTABLE MIGRAINE WITHOUT STATUS MIGRAINOSUS, UNSPECIFIED MIGRAINE TYPE: Primary | ICD-10-CM

## 2021-08-27 PROCEDURE — 99203 OFFICE O/P NEW LOW 30 MIN: CPT | Performed by: PLASTIC SURGERY

## 2021-08-27 NOTE — PROGRESS NOTES
Department of Plastic Surgery - Adult  Attending Migraine Consult Note      CHIEF COMPLAINT:   Migraine Headache    History Obtained From:  patient    HISTORY OF PRESENT ILLNESS:                The patient states that they have been having migraines for 15 years. They state that their migraines have  gotten prossively worse over the past 15 years. They state that their migraines occur daily, everyday without botox injections and usually lasts all day, which last for 4 or more hours at a time. They admit to having a minimum of 25-30 headaches per month. They do  admit to having them multiple times per day. They do  admit to having auras which occur  before a migraine. The patient states that auras are associated with pain which can be unilateral or bilateral and come in the ( supraorbital, temporal, and occipital )  Areas which are They are pulsatile 95% of the time. The auras typically begin in a left lateral distribution. They state that they do  frequently need to miss work second to the limitations associated with their migraines. They are worsened by physical activity and they  have a difficult time working out physically. The patient states that they are currently under the care of a Neurologist and are currently not being prescribed medication. She states that once she strated that after having Botox injections for 2 years she never had any problems or need for abortive medications such as triptans. They typically use NSAIDS,  to treat her migraines. The patient states that with medication prior to their migraine they can have alleviation 0% of the time. They state they would like to avoid narcotic pain medication 2nd to the associated side effects. They have  had Botox injections in the past with 100% relief of symptoms.   They do not admit to having a history of nasal trauma in the past.             Past Medical History:    Past Medical History:   Diagnosis Date    Atypical migraine     Fibromyalgia  Goiter     History of blood transfusion 1979    [place] tonsil    Hypertrophy of uterus 10/9/2020    Menometrorrhagia 8/20/2020    PCOS (polycystic ovarian syndrome)      Past Surgical History:    Past Surgical History:   Procedure Laterality Date    BLADDER REPAIR      BREAST SURGERY  2018    breast ductal excision    DILATION AND CURETTAGE OF UTERUS N/A 8/21/2020    DILATATION AND CURETTAGE HYSTEROSCOPY performed by Darwin Eddy MD [address] Bilateral 10/9/2020    ROBOTIC XI ASSISTED TOTAL LAPAROSCOPIC HYSTERECTOMY WITH BILATERAL SALPINGECTOMY performed by Darwin Eddy MD at [address]     Current Medications:   No current facility-administered medications for this visit. Allergies:  Naproxen, Food, Topamax [topiramate], and Adhesive tape    Social History:   Social History     Socioeconomic History    Marital status:      Spouse name: Not on file    Number of children: Not on file    Years of education: Not on file    Highest education level: Not on file   Occupational History    Not on file   Tobacco Use    Smoking status: Never Smoker    Smokeless tobacco: Never Used   Vaping Use    Vaping Use: Never used   Substance and Sexual Activity    Alcohol use: Never    Drug use: Never    Sexual activity: Yes     Partners: Male   Other Topics Concern    Not on file   Social History Narrative    Not on file     Social Determinants of Health     Financial Resource Strain:     Difficulty of Paying Living Expenses:    Food Insecurity:     Worried About Running Out of Food in the Last Year:     [address] in the Last Year:    Transportation Needs:     Lack of Transportation (Medical):      Lack of Transportation (Non-Medical):    Physical Activity:     Days of Exercise per Week:     Minutes of Exercise per Session:    Stress:     Feeling of Stress :    Social Connections:     Frequency of Communication with Friends and Family:     Frequency of Social Gatherings with Friends and Family:     Attends Religion Services:     Active Member of Clubs or Organizations:     Attends Club or Organization Meetings:     Marital Status:    Intimate Partner Violence:     Fear of Current or Ex-Partner:     Emotionally Abused:     Physically Abused:     Sexually Abused:      Family History:   Family History   Problem Relation Age of Onset    Heart Disease Maternal Grandmother     Hypertension Maternal Grandmother     Diabetes Mother     Ovarian Cancer Maternal Aunt     No Known Problems Father        REVIEW OF SYSTEMS:    CONSTITUTIONAL:  negative for  fevers, chills, sweats and fatigue  EYES: negative for dipolpia or acute vision loss. RESPIRATORY:  negative for  dry cough, cough with sputum, dyspnea, wheezing and chest pain  CARDIOVASCULAR:  negative for  chest pain, dyspnea, palpitations, syncope  GASTROINTESTINAL:  negative for nausea, vomiting, change in bowel habits, diarrhea, constipation and abdominal pain  EXTREMITIES: negative for edema  MUSCULOSKELETAL: negative for muscle weakness  SKIN: negative for itching or rashes. BEHAVIOR/PSYCH:  negative for poor appetite, increased appetite, decreased sleep and poor concentration        PHYSICAL EXAM:      VITALS:  /82 (Site: Left Upper Arm, Position: Sitting, Cuff Size: Medium Adult)   Pulse 103   Temp 97.3 °F (36.3 °C) (Skin)   Resp 16   Ht 5' 7\" (1.702 m)   Wt 206 lb 4.8 oz (93.6 kg)   LMP 10/02/2020   SpO2 98%   Breastfeeding No Comment: hysterectomy in october 2020  BMI 32.31 kg/m²   CONSTITUTIONAL:  awake, alert, cooperative, no apparent distress, and appears stated age  EYES: PERRLA, EOMI, no signs of occular infection  LUNGS:  No increased work of breathing, good air exchange  CARDIOVASCULAR:  Normal apical impulse, regular rate and rhythm  EXTREMITIES: no signs of clubbing or cyanosis.   MUSCULOSKELETAL: negative for flaccid muscle tone or spastic movements. SKIN: gross examination reveals no signs of rashes, or diaphoresis. NEURO: Cranial nerves II-XII grossly intact. No signs of agitated mood. CBC:   Lab Results   Component Value Date    WBC 7.2 10/05/2020    RBC 4.15 10/05/2020    RBC 4.18 05/31/2019    HGB 12.1 10/05/2020    HCT 37.8 10/05/2020    MCV 91.1 10/05/2020    MCH 29.2 10/05/2020    MCHC 32.0 10/05/2020    RDW 12.9 10/05/2020     10/05/2020    MPV 10.7 10/05/2020     BMP:  No results found for: NA, K, CL, CO2, BUN, LABALBU, CREATININE, CALCIUM, GFRAA, LABGLOM, GLUCOSE  Hepatic Function Panel:  No results found for: ALKPHOS, ALT, AST, PROT, BILITOT, BILIDIR, IBILI, LABALBU     Data- Radiology Review:  None at this time    DATA:    Radiology Review:  None at this     IMPRESSION/RECOMMENDATIONS:        Migraine, symptomatic. As the patient has had a 2-year duration of Botox injections with relief of symptoms completely mitigating her need for p.o. abortive treatments I recommend she return to her regimen of Botox injections. Second to the patient's insurance limitations and having her provider move out of state I believe getting her back on with Botox injections at our office would benefit her greatly. Chronic migraine headaches, with aura, nonintractable, Greater than 15 per month lasting greater than 4 hours. Refractory to medication. Trigger sites identified as  (supraorbital, bilateral temporal, bilateral occipital/trapezial.)    Migraine log sheet given to patient today for documentation. Continue with migraine specific acute medications if indicated or as needed.         FU after completion of prior authorization  Gaby Keller MD

## 2021-09-29 ENCOUNTER — OFFICE VISIT (OUTPATIENT)
Dept: SURGERY | Age: 46
End: 2021-09-29
Payer: COMMERCIAL

## 2021-09-29 VITALS
DIASTOLIC BLOOD PRESSURE: 68 MMHG | SYSTOLIC BLOOD PRESSURE: 100 MMHG | HEART RATE: 75 BPM | TEMPERATURE: 98.1 F | HEIGHT: 67 IN | WEIGHT: 200 LBS | BODY MASS INDEX: 31.39 KG/M2 | RESPIRATION RATE: 20 BRPM | OXYGEN SATURATION: 96 %

## 2021-09-29 DIAGNOSIS — L98.9 LESION OF SKIN OF FACE: Primary | ICD-10-CM

## 2021-09-29 PROCEDURE — 11102 TANGNTL BX SKIN SINGLE LES: CPT | Performed by: PLASTIC SURGERY

## 2021-09-29 PROCEDURE — 64615 CHEMODENERV MUSC MIGRAINE: CPT | Performed by: PLASTIC SURGERY

## 2021-09-29 PROCEDURE — 99213 OFFICE O/P EST LOW 20 MIN: CPT | Performed by: PLASTIC SURGERY

## 2021-09-29 RX ORDER — LIDOCAINE HYDROCHLORIDE AND EPINEPHRINE 10; 10 MG/ML; UG/ML
20 INJECTION, SOLUTION INFILTRATION; PERINEURAL ONCE
Status: COMPLETED | OUTPATIENT
Start: 2021-09-29 | End: 2021-09-29

## 2021-09-29 RX ADMIN — LIDOCAINE HYDROCHLORIDE AND EPINEPHRINE 20 ML: 10; 10 INJECTION, SOLUTION INFILTRATION; PERINEURAL at 13:40

## 2021-09-29 NOTE — PROGRESS NOTES
Medical Botox CGR#[id number] exp 01/2024    Bacteriostatic 0.9% Sodium Chloride   lot# [id number]            Expiration:    [id number]              TQI:[id number]

## 2021-10-11 ENCOUNTER — VIRTUAL VISIT (OUTPATIENT)
Dept: SURGERY | Age: 46
End: 2021-10-11

## 2021-10-11 DIAGNOSIS — L91.0 HYPERTROPHIC SCAR: Primary | ICD-10-CM

## 2021-10-11 PROCEDURE — 99999 PR OFFICE/OUTPT VISIT,PROCEDURE ONLY: CPT | Performed by: PHYSICIAN ASSISTANT

## 2021-10-12 NOTE — PROGRESS NOTES
oLrne Quiroga is a 42 y.o. female evaluated via telephone on 10/11/2021. Consent:  She and/or health care decision maker is aware that that she may receive a bill for this telephone service, depending on her insurance coverage, and has provided verbal consent to proceed: Yes    The patient was in the state of [place] during the entirety of the phone conversation. Documentation:  I communicated with the patient and/or health care decision maker about the pathology results from their most recent biopsy. Details of this discussion including any medical advice provided:     Pathology [address] [id number]       [address] [place] [address] 27     [address] [place]            [address]                                                   [address] [facility], [address] [facility], [address] [place] [address]               FINAL SURGICAL PATHOLOGY REPORT     NAME:           Sandra Rodriguez       Date of       09/29/2021                                            Collection:   Medical Record   [id number]              Date of       09/30/2021   Number:                                  Receipt:   Age:  38 Y        Sex:  F                Date          10/04/2021 11:13                                            Reported:   Date Of Birth:   1975   Financial        [id number]            Admitting     [employer]   Number:                                  Physician:   Patient          HEWSHANNA                   Ordering      SARAH ADDISON   Location:                                Physician:     Accession Number:  [id number]                                            Additional Physicians:ALICJA MINOR       Diagnosis:   Skin, right side of face: Focal dermal fibrosis consistent with cicatrix.

## 2021-10-13 NOTE — PROGRESS NOTES
Subjective: Follow up today from previous injections of Botox for migraine headache. presentation of symptomatic migraine headaches. Denies fever, nausea, vomiting, leg pain or swelling, pain is moderate to severe. The pt states that they have had continued migraine headaches over the past 3 months. The severity and frequency have decreased prior to beginning Botox. The patient states that they are still having greater than 15 days/month with headaches lasting greater than 4 hours or longer. They state that their migraines are still refractory to prescribed medications (triptans) and ibuprofen. The patient states they are refractory to both abortive and prophylactic medications. They present today to review migraine log and plan for continued botulinum toxin injection. They do  see a neurologist at this time. The patient states that their migraines have improved in severity and overall duration since the initial Botox injection. The patient also brings to my attention a lesion of the right face that began as a pigmented mole but lost its pigment since \"she started getting Botox\".     Objective:    /68 (Site: Left Upper Arm, Position: Sitting, Cuff Size: Medium Adult)   Pulse 75   Temp 98.1 °F (36.7 °C) (Skin)   Resp 20   Ht 5' 7\" (1.702 m)   Wt 200 lb (90.7 kg)   LMP 10/02/2020   SpO2 96%   BMI 31.32 kg/m²       Assessment:    CBC:   Lab Results   Component Value Date    WBC 7.2 10/05/2020    RBC 4.15 10/05/2020    RBC 4.18 05/31/2019    HGB 12.1 10/05/2020    HCT 37.8 10/05/2020    MCV 91.1 10/05/2020    MCH 29.2 10/05/2020    MCHC 32.0 10/05/2020    RDW 12.9 10/05/2020     10/05/2020    MPV 10.7 10/05/2020     BMP:  No results found for: NA, K, CL, CO2, BUN, LABALBU, CREATININE, CALCIUM, GFRAA, LABGLOM, GLUCOSE  Hepatic Function Panel:  No results found for: ALKPHOS, ALT, AST, PROT, BILITOT, BILIDIR, IBILI, LABALBU   Patient Active Problem List   Diagnosis    Menometrorrhagia    Hypertrophy of uterus    History of robot-assisted laparoscopic hysterectomy     Dynamic motion of the face in the frontal, glabellar, temple area. 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 Plan:     Shave biopsy indicated for an amelanotic lesion that has changed over time. The area was localized with 1% lidocaine with 1 100,000 epinephrine and allowed to work. A 15 blade was used to shave the lesion. Monsel solution was used for hemostasis. Bacitracin and a Band-Aid applied. Migraine sheet reviewed today with the patient over the past month. Patient suffers from significant migraine history with greater then 15 migraine occurrences throughout the month lasting for more than 4 hours that are still refractory to medication. The patient's trigger sites are identified ( Bilateralsupraorbital bilateral temporal, frontal  Recommend 75 units of Botox for continued symptomatic migraines        -  10 Units divided between 2 sites   Procerus- 5 units 1 site  Frontalis - 10 units divided between 4 sites  Temporalis- 40 units divided between 8 sites    Total Dose - 75 units       Migraine log sheet given to patient today for documentation. The patient will likely benefit from continued botulinum toxin injections to their trigger point sites to aid in alleviating the symptoms frequency and intensity and debilitating factors other migraine headaches. Botulinum Toxin Injection Procedure Note:        The risks, benefits and options were discussed with the pt. The risks included but not limited to pain, bleeding, infection, asymmetry,  and need for further procedures. The patient understands that there is a risk for upper eyelid ptosis. There may be a need for touch up injections and follow up at 2 weeks is encouraged. All of Her questions were answered to Her satisfaction and She agrees to proceed with the operation. The occiput and trapezial area was cleansed with alcohol.  Ice was used to numb the area. Botox was agreed upon and reconstituted in a concentration of 1 unit/ 0.01cc with sterile injectable saline. 75 units were injected into the areas. There was pinpoint bleeding and local redness. The patient tolerated the procedure well. There was 25 units Botox waste. The patient was counseled to use ice for the next hour and limit strenuous activity for 24 hours. Continue with PO migraine medication PRN. Follow up in 3 months        This document is generated, in part, by voice recognition software and thus  syntax and grammatical errors are possible.     Aide Roberts MD  9:06 AM  10/13/2021

## 2021-12-22 ENCOUNTER — OFFICE VISIT (OUTPATIENT)
Dept: SURGERY | Age: 46
End: 2021-12-22
Payer: COMMERCIAL

## 2021-12-22 VITALS — TEMPERATURE: 96.2 F

## 2021-12-22 DIAGNOSIS — G43.919 INTRACTABLE MIGRAINE WITHOUT STATUS MIGRAINOSUS, UNSPECIFIED MIGRAINE TYPE: Primary | ICD-10-CM

## 2021-12-22 PROCEDURE — 99213 OFFICE O/P EST LOW 20 MIN: CPT | Performed by: PHYSICIAN ASSISTANT

## 2021-12-22 PROCEDURE — 64615 CHEMODENERV MUSC MIGRAINE: CPT | Performed by: PHYSICIAN ASSISTANT

## 2021-12-22 NOTE — PROGRESS NOTES
Medical Botox-75 units  [facility] [id number]  LOT [id number]  EXP 01/2024    Bacteriostatic 0.9% Sodium chloride  LOT [id number]  EXP 01 OCT 2022  [facility] [id number]
trigger sites are identified ( Bilateralsupraorbital bilateral temporal, frontal  Recommend 75 units of Botox for continued symptomatic migraines        -  10 Units divided between 2 sites   Procerus- 5 units 1 site  Frontalis - 10 units divided between 4 sites  Temporalis- 40 units divided between 8 sites    Total Dose - 75 units     The patient will likely benefit from continued botulinum toxin injections to their trigger point sites to aid in alleviating the symptoms frequency and intensity and debilitating factors other migraine headaches. Botulinum Toxin Injection Procedure Note:        The risks, benefits and options were discussed with the pt. The risks included but not limited to pain, bleeding, infection, asymmetry,  and need for further procedures. The patient understands that there is a risk for upper eyelid ptosis. There may be a need for touch up injections and follow up at 2 weeks is encouraged. All of Her questions were answered to Her satisfaction and She agrees to proceed with the operation. Ice was used to numb the area. Botox was agreed upon and reconstituted in a concentration of 1 unit/ 0.01cc with sterile injectable saline. 75 units were injected into the areas. There was pinpoint bleeding and local redness. The patient tolerated the procedure well. There was 25 units Botox waste. The patient was counseled to use ice for the next hour and limit strenuous activity for 24 hours. Continue with PO migraine medication PRN. Follow up in 3 months    The patient's Botox was sent to our office by her pharmacy. This document is generated, in part, by voice recognition software and thus  syntax and grammatical errors are possible.     Javier Eaton  9:35 AM  12/22/2021

## 2022-03-14 NOTE — PROGRESS NOTES
Subjective: Follow up today from previous injections of Botox for migraine headache. presentation of symptomatic migraine headaches. Denies fever, nausea, vomiting, leg pain or swelling, pain is moderate to severe. The pt states that they have had continued migraine headaches over the past 3 months. The severity and frequency have decreased prior to beginning Botox. The patient states that they are still having greater than 15 days/month with headaches lasting greater than 4 hours or longer. They state that their migraines are still refractory to prescribed medications (triptans) and ibuprofen. The patient states they are refractory to both abortive and prophylactic medications. They present today to review migraine log and plan for continued botulinum toxin injection. They do  see a neurologist at this time. The patient states that their migraines have improved in severity and overall duration since the initial Botox injection. Objective:    LMP 10/02/2020       Assessment:    CBC:   Lab Results   Component Value Date    WBC 7.2 10/05/2020    RBC 4.15 10/05/2020    RBC 4.18 05/31/2019    HGB 12.1 10/05/2020    HCT 37.8 10/05/2020    MCV 91.1 10/05/2020    MCH 29.2 10/05/2020    MCHC 32.0 10/05/2020    RDW 12.9 10/05/2020     10/05/2020    MPV 10.7 10/05/2020     BMP:  No results found for: NA, K, CL, CO2, BUN, LABALBU, CREATININE, CALCIUM, GFRAA, LABGLOM, GLUCOSE, GLU  Hepatic Function Panel:  No results found for: ALKPHOS, ALT, AST, PROT, BILITOT, BILIDIR, IBILI, LABALBU   Patient Active Problem List   Diagnosis    Menometrorrhagia    Hypertrophy of uterus    History of robot-assisted laparoscopic hysterectomy     Dynamic motion of the face in the frontal, glabellar, temple area. 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 Plan:     Shave biopsy indicated for an amelanotic lesion that has changed over time.   The area was localized with 1% lidocaine with 1 100,000 epinephrine and allowed to work. A 15 blade was used to shave the lesion. Monsel solution was used for hemostasis. Bacitracin and a Band-Aid applied. Migraine sheet reviewed today with the patient over the past month. Patient suffers from significant migraine history with greater then 15 migraine occurrences throughout the month lasting for more than 4 hours that are still refractory to medication. The patient's trigger sites are identified ( Bilateralsupraorbital bilateral temporal, frontal  Recommend 75 units of Botox for continued symptomatic migraines        -  10 Units divided between 2 sites   Procerus- 5 units 1 site  Frontalis - 10 units divided between 4 sites  Temporalis- 40 units divided between 8 sites    Total Dose - 75 units       Migraine log sheet given to patient today for documentation. The patient will likely benefit from continued botulinum toxin injections to their trigger point sites to aid in alleviating the symptoms frequency and intensity and debilitating factors other migraine headaches. Botulinum Toxin Injection Procedure Note:        The risks, benefits and options were discussed with the pt. The risks included but not limited to pain, bleeding, infection, asymmetry,  and need for further procedures. The patient understands that there is a risk for upper eyelid ptosis. There may be a need for touch up injections and follow up at 2 weeks is encouraged. All of Her questions were answered to Her satisfaction and She agrees to proceed with the operation. The occiput and trapezial area was cleansed with alcohol. Ice was used to numb the area. Botox was agreed upon and reconstituted in a concentration of 1 unit/ 0.01cc with sterile injectable saline. 75 units were injected into the areas. There was pinpoint bleeding and local redness. The patient tolerated the procedure well. There was 25 units Botox waste.      The patient was counseled to use ice for the next hour and limit strenuous activity for 24 hours. Continue with PO migraine medication PRN.     Follow up in 3 months      Javier Oropeza

## 2022-03-18 ENCOUNTER — OFFICE VISIT (OUTPATIENT)
Dept: SURGERY | Age: 47
End: 2022-03-18
Payer: COMMERCIAL

## 2022-03-18 VITALS — TEMPERATURE: 97.9 F

## 2022-03-18 DIAGNOSIS — G43.919 INTRACTABLE MIGRAINE WITHOUT STATUS MIGRAINOSUS, UNSPECIFIED MIGRAINE TYPE: Primary | ICD-10-CM

## 2022-03-18 PROCEDURE — 99213 OFFICE O/P EST LOW 20 MIN: CPT | Performed by: PHYSICIAN ASSISTANT

## 2022-03-18 PROCEDURE — 64615 CHEMODENERV MUSC MIGRAINE: CPT | Performed by: PHYSICIAN ASSISTANT

## 2022-03-18 PROCEDURE — 11102 TANGNTL BX SKIN SINGLE LES: CPT | Performed by: PHYSICIAN ASSISTANT

## 2022-03-18 NOTE — PROGRESS NOTES
Medical Botox- 83 units  LOT [id number]  EXP 05/2024    Bacteriostatic 0.9% sodium chloride  LOT [id number]  EXP 01 AUG 2023  Nay[facility] 47 [id number]

## 2022-06-15 ENCOUNTER — OFFICE VISIT (OUTPATIENT)
Dept: SURGERY | Age: 47
End: 2022-06-15
Payer: COMMERCIAL

## 2022-06-15 VITALS — TEMPERATURE: 97.7 F

## 2022-06-15 DIAGNOSIS — G43.919 INTRACTABLE MIGRAINE WITHOUT STATUS MIGRAINOSUS, UNSPECIFIED MIGRAINE TYPE: Primary | ICD-10-CM

## 2022-06-15 PROCEDURE — 64615 CHEMODENERV MUSC MIGRAINE: CPT | Performed by: PHYSICIAN ASSISTANT

## 2022-06-15 PROCEDURE — 99213 OFFICE O/P EST LOW 20 MIN: CPT | Performed by: PHYSICIAN ASSISTANT

## 2022-06-15 NOTE — PROGRESS NOTES
Subjective: Follow up today from previous injections of Botox for migraine headache. presentation of symptomatic migraine headaches. Denies fever, nausea, vomiting, leg pain or swelling, pain is moderate to severe. The pt states that they have had continued migraine headaches over the past 3 months. The severity and frequency have decreased prior to beginning Botox. They state that their migraines are still refractory to prescribed medications (triptans) and ibuprofen. The patient states they are refractory to both abortive and prophylactic medications. They present today to review migraine log and plan for continued botulinum toxin injection. They do  see a neurologist at this time. The patient states that their migraines have improved in severity and overall duration since the initial Botox injection. She states she enjoyed the amount and location of her last Botox injections and is having continued wonderful results. Objective:    Temp 97.7 °F (36.5 °C) (Temporal)   LMP 10/02/2020   Breastfeeding No       Assessment:    CBC:   Lab Results   Component Value Date    WBC 7.2 10/05/2020    RBC 4.15 10/05/2020    RBC 4.18 05/31/2019    HGB 12.1 10/05/2020    HCT 37.8 10/05/2020    MCV 91.1 10/05/2020    MCH 29.2 10/05/2020    MCHC 32.0 10/05/2020    RDW 12.9 10/05/2020     10/05/2020    MPV 10.7 10/05/2020     BMP:  No results found for: NA, K, CL, CO2, BUN, LABALBU, CREATININE, CALCIUM, GFRAA, LABGLOM, GLUCOSE, GLU  Hepatic Function Panel:  No results found for: ALKPHOS, ALT, AST, PROT, BILITOT, BILIDIR, IBILI, LABALBU   Patient Active Problem List   Diagnosis    Menometrorrhagia    Hypertrophy of uterus    History of robot-assisted laparoscopic hysterectomy     Dynamic motion of the face in the frontal, glabellar, temple area. 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 Plan:       Migraine sheet reviewed today with the patient over the past month.  Patient suffers from significant migraine history with greater then 15 migraine occurrences throughout the month lasting for more than 4 hours that are still refractory to medication. The patient's trigger sites are identified ( Bilateralsupraorbital bilateral temporal, frontal  Recommend 75 units of Botox for continued symptomatic migraines        -  10 Units divided between 2 sites   Procerus- 5 units 1 site  Frontalis - 10 units divided between 4 sites  Temporalis- 40 units divided between 8 sites    Total Dose - 75 units       Migraine log sheet given to patient today for documentation. The patient will likely benefit from continued botulinum toxin injections to their trigger point sites to aid in alleviating the symptoms frequency and intensity and debilitating factors other migraine headaches. Botulinum Toxin Injection Procedure Note:        The risks, benefits and options were discussed with the pt. The risks included but not limited to pain, bleeding, infection, asymmetry,  and need for further procedures. The patient understands that there is a risk for upper eyelid ptosis. There may be a need for touch up injections and follow up at 2 weeks is encouraged. All of Her questions were answered to Her satisfaction and She agrees to proceed with the operation. The occiput and trapezial area was cleansed with alcohol. Ice was used to numb the area. Botox was agreed upon and reconstituted in a concentration of 1 unit/ 0.01cc with sterile injectable saline. 75 units were injected into the areas. There was pinpoint bleeding and local redness. The patient tolerated the procedure well. There was 25 units Botox waste. The patient was counseled to use ice for the next hour and limit strenuous activity for 24 hours. The patient's Botox was sent to our office by her pharmacy. Continue with PO migraine medication PRN.     Follow up in 3 months      [place], [place]

## 2022-06-15 NOTE — PROGRESS NOTES
Medical botox- DO NOT BILL-83 units  NDC 0284-0439-63  LOT [id number]  EXP 05/2024    Bacteriostatic 0.9% sodium chloride  LOT [id number]  EXP 10/23  Nayeli Perez 49 [phone]

## 2022-09-15 NOTE — PROGRESS NOTES
Subjective: Follow up today from previous injections of Botox for migraine headache. presentation of symptomatic migraine headaches. Denies fever, nausea, vomiting, leg pain or swelling, pain is moderate to severe. The pt states that they have had continued migraine headaches over the past 3 months. The severity and frequency have decreased prior to beginning Botox. They state that their migraines are still refractory to prescribed medications (triptans) and ibuprofen. The patient states they are refractory to both abortive and prophylactic medications. They present today to review migraine log and plan for continued botulinum toxin injection. They do not see a neurologist at this time as she states her migraines are worlds better with Botox alone and does not see a reason to follow with her neurologist currently. She states that the Botox injections have worked so well that she would prefer continuing with Botox and does not want to go back to neurologic treatments such as p.o medications for her migraines with a neurologist.  The patient states that their migraines have improved in severity and overall duration since the initial Botox injection. She states she enjoyed the amount and location of her last Botox injections and is having continued wonderful results.     Objective:    LMP 10/02/2020       Assessment:    CBC:   Lab Results   Component Value Date/Time    WBC 7.2 10/05/2020 01:55 PM    RBC 4.15 10/05/2020 01:55 PM    RBC 4.18 05/31/2019 10:15 AM    HGB 12.1 10/05/2020 01:55 PM    HCT 37.8 10/05/2020 01:55 PM    MCV 91.1 10/05/2020 01:55 PM    MCH 29.2 10/05/2020 01:55 PM    MCHC 32.0 10/05/2020 01:55 PM    RDW 12.9 10/05/2020 01:55 PM     10/05/2020 01:55 PM    MPV 10.7 10/05/2020 01:55 PM     BMP:  No results found for: NA, K, CL, CO2, BUN, LABALBU, CREATININE, CALCIUM, GFRAA, LABGLOM, GLUCOSE, GLU  Hepatic Function Panel:  No results found for: ALKPHOS, ALT, AST, PROT, BILITOT, BILIDIR, CASSIE DICKSON   Patient Active Problem List   Diagnosis    Menometrorrhagia    Hypertrophy of uterus    History of robot-assisted laparoscopic hysterectomy     Dynamic motion of the face in the frontal, glabellar, temple area. 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 Plan:       Migraine sheet reviewed today with the patient over the past month. Patient suffers from significant migraine history with greater then 15 migraine occurrences throughout the month lasting for more than 4 hours that are still refractory to medication. The patient's trigger sites are identified ( Bilateralsupraorbital bilateral temporal, frontal  Recommend 83 units of Botox for continued symptomatic migraines        -  10 Units divided between 2 sites   Procerus- 5 units 1 site  Frontalis - 10 units divided between 4 sites  Temporalis- 48 units divided between 8 sites    Total Dose - 83 units       Migraine log sheet given to patient today for documentation. The patient will likely benefit from continued botulinum toxin injections to their trigger point sites to aid in alleviating the symptoms frequency and intensity and debilitating factors other migraine headaches. Botulinum Toxin Injection Procedure Note:        The risks, benefits and options were discussed with the pt. The risks included but not limited to pain, bleeding, infection, asymmetry,  and need for further procedures. The patient understands that there is a risk for upper eyelid ptosis. There may be a need for touch up injections and follow up at 2 weeks is encouraged. All of Her questions were answered to Her satisfaction and She agrees to proceed with the operation. The occiput and trapezial area was cleansed with alcohol. Ice was used to numb the area. Botox was agreed upon and reconstituted in a concentration of 1 unit/ 0.01cc with sterile injectable saline. 83 units were injected into the areas.  There was pinpoint bleeding and local redness. The patient tolerated the procedure well. There was 17 units Botox waste. The patient was counseled to use ice for the next hour and limit strenuous activity for 24 hours. The patient's Botox was sent to our office by her pharmacy. Continue with PO migraine medication PRN.     Follow up in 3 months      Svitlana Av[place], [place]

## 2022-09-16 ENCOUNTER — OFFICE VISIT (OUTPATIENT)
Dept: SURGERY | Age: 47
End: 2022-09-16
Payer: COMMERCIAL

## 2022-09-16 VITALS — HEART RATE: 85 BPM | OXYGEN SATURATION: 99 % | TEMPERATURE: 97 F

## 2022-09-16 DIAGNOSIS — G43.919 INTRACTABLE MIGRAINE WITHOUT STATUS MIGRAINOSUS, UNSPECIFIED MIGRAINE TYPE: Primary | ICD-10-CM

## 2022-09-16 PROCEDURE — 64615 CHEMODENERV MUSC MIGRAINE: CPT | Performed by: PHYSICIAN ASSISTANT

## 2022-09-16 NOTE — PROGRESS NOTES
Medical botox-83 units  Judy Perez 49 [id number]  LOT # [id number]  EXP 08/2024    Bacteriostatic 0.9% sodium chloride  LOT [id number]  NDC 2930-6614-99  EXP 11/01/2022

## 2022-12-14 NOTE — PROGRESS NOTES
Subjective: Follow up today from previous injections of Botox for migraine headache. presentation of symptomatic migraine headaches. Denies fever, nausea, vomiting, leg pain or swelling, pain is moderate to severe. The pt states that they have had continued migraine headaches over the past 3 months. The severity and frequency have decreased prior to beginning Botox. They state that their migraines are still refractory to prescribed medications (triptans) and ibuprofen. The patient states they are refractory to both abortive and prophylactic medications. They present today to review migraine log and plan for continued botulinum toxin injection. They do not see a neurologist at this time as she states her migraines are worlds better with Botox alone and does not see a reason to follow with her neurologist currently. She states that the Botox injections have worked so well that she would prefer continuing with Botox and does not want to go back to neurologic treatments such as p.o medications for her migraines with a neurologist.  The patient states that their migraines have improved in severity and overall duration since the initial Botox injection. She states she enjoyed the amount and location of her last Botox injections and is having continued wonderful results.     Objective:    LMP 10/02/2020       Assessment:    CBC:   Lab Results   Component Value Date/Time    WBC 7.2 10/05/2020 01:55 PM    RBC 4.15 10/05/2020 01:55 PM    RBC 4.18 05/31/2019 10:15 AM    HGB 12.1 10/05/2020 01:55 PM    HCT 37.8 10/05/2020 01:55 PM    MCV 91.1 10/05/2020 01:55 PM    MCH 29.2 10/05/2020 01:55 PM    MCHC 32.0 10/05/2020 01:55 PM    RDW 12.9 10/05/2020 01:55 PM     10/05/2020 01:55 PM    MPV 10.7 10/05/2020 01:55 PM     BMP:  No results found for: NA, K, CL, CO2, BUN, LABALBU, CREATININE, CALCIUM, GFRAA, LABGLOM, GLUCOSE, GLU  Hepatic Function Panel:  No results found for: ALKPHOS, ALT, AST, PROT, BILITOT, BILIDIR, CASSIE DICKSON   Patient Active Problem List   Diagnosis    Menometrorrhagia    Hypertrophy of uterus    History of robot-assisted laparoscopic hysterectomy     Dynamic motion of the face in the frontal, glabellar, temple area. 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 Plan:       Migraine sheet reviewed today with the patient over the past month. Patient suffers from significant migraine history with greater then 15 migraine occurrences throughout the month lasting for more than 4 hours that are still refractory to medication. The patient's trigger sites are identified ( Bilateralsupraorbital bilateral temporal, frontal  Recommend 83 units of Botox for continued symptomatic migraines        -  10 Units divided between 2 sites   Procerus- 5 units 1 site  Frontalis - 10 units divided between 4 sites  Temporalis- 48 units divided between 8 sites    Total Dose - 83 units       Migraine log sheet given to patient today for documentation. The patient will likely benefit from continued botulinum toxin injections to their trigger point sites to aid in alleviating the symptoms frequency and intensity and debilitating factors other migraine headaches. Botulinum Toxin Injection Procedure Note:        The risks, benefits and options were discussed with the pt. The risks included but not limited to pain, bleeding, infection, asymmetry,  and need for further procedures. The patient understands that there is a risk for upper eyelid ptosis. There may be a need for touch up injections and follow up at 2 weeks is encouraged. All of Her questions were answered to Her satisfaction and She agrees to proceed with the operation. The occiput and trapezial area was cleansed with alcohol. Ice was used to numb the area. Botox was agreed upon and reconstituted in a concentration of 1 unit/ 0.01cc with sterile injectable saline. 83 units were injected into the areas.  There was pinpoint bleeding and local redness. The patient tolerated the procedure well. There was 17 units Botox waste. The patient was counseled to use ice for the next hour and limit strenuous activity for 24 hours. The patient's Botox was sent to our office by her pharmacy. Continue with PO migraine medication PRN. Follow up in 3 months        This document is generated, in part, by voice recognition software and thus  syntax and grammatical errors are possible.     Matias Patel MD  10:09 AM  12/16/2022

## 2022-12-16 ENCOUNTER — OFFICE VISIT (OUTPATIENT)
Dept: SURGERY | Age: 47
End: 2022-12-16

## 2022-12-16 VITALS — TEMPERATURE: 99.2 F

## 2022-12-16 DIAGNOSIS — G43.919 INTRACTABLE MIGRAINE WITHOUT STATUS MIGRAINOSUS, UNSPECIFIED MIGRAINE TYPE: Primary | ICD-10-CM

## 2022-12-20 NOTE — PROGRESS NOTES
Medical Botox lot#[id number] expiration 2024/08  QZZ:5891-9911-73    Bacteriostatic 0.9% Sodium Chloride   lot#  [id number]           Expiration:     [id number]             [facility]:[id number]

## 2023-03-10 ENCOUNTER — OFFICE VISIT (OUTPATIENT)
Dept: SURGERY | Age: 48
End: 2023-03-10

## 2023-03-10 VITALS — TEMPERATURE: 100.1 F

## 2023-03-10 DIAGNOSIS — G43.919 INTRACTABLE MIGRAINE WITHOUT STATUS MIGRAINOSUS, UNSPECIFIED MIGRAINE TYPE: Primary | ICD-10-CM

## 2023-03-10 NOTE — PROGRESS NOTES
Subjective: Follow up today from previous injections of Botox for migraine headache. presentation of symptomatic migraine headaches. Denies fever, nausea, vomiting, leg pain or swelling, pain is moderate to severe. The pt states that they have had continued migraine headaches over the past 3 months. The severity and frequency have decreased prior to beginning Botox. They state that their migraines are still refractory to prescribed medications (triptans) and ibuprofen. The patient states they are refractory to both abortive and prophylactic medications. They present today to review migraine log and plan for continued botulinum toxin injection. They do not see a neurologist at this time as she states her migraines are worlds better with Botox alone and does not see a reason to follow with her neurologist currently. She states that the Botox injections have worked so well that she would prefer continuing with Botox and does not want to go back to neurologic treatments such as p.o medications for her migraines with a neurologist.  The patient states that their migraines have improved in severity and overall duration since the initial Botox injection. She states she enjoyed the amount and location of her last Botox injections and is having continued wonderful results. She states that she has noted with the increase in her Botox injections that she is not having \"word searching\" at the end of her injection cycle on that 3-month basis. She states that her Botox injections have been life-changing.       Objective:    Temp 100.1 °F (37.8 °C) (Temporal)   LMP 10/02/2020       Assessment:    CBC:   Lab Results   Component Value Date/Time    WBC 7.2 10/05/2020 01:55 PM    RBC 4.15 10/05/2020 01:55 PM    RBC 4.18 05/31/2019 10:15 AM    HGB 12.1 10/05/2020 01:55 PM    HCT 37.8 10/05/2020 01:55 PM    MCV 91.1 10/05/2020 01:55 PM    MCH 29.2 10/05/2020 01:55 PM    MCHC 32.0 10/05/2020 01:55 PM    RDW 12.9 10/05/2020 01:55 PM     10/05/2020 01:55 PM    MPV 10.7 10/05/2020 01:55 PM     BMP:  No results found for: NA, K, CL, CO2, BUN, LABALBU, CREATININE, CALCIUM, GFRAA, LABGLOM, GLUCOSE, GLU  Hepatic Function Panel:  No results found for: ALKPHOS, ALT, AST, PROT, BILITOT, BILIDIR, IBILI, LABALBU   Patient Active Problem List   Diagnosis    Menometrorrhagia    Hypertrophy of uterus    History of robot-assisted laparoscopic hysterectomy     Dynamic motion of the face in the frontal, glabellar, temple area. 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 Plan:       Migraine sheet reviewed today with the patient over the past month. Patient suffers from significant migraine history with greater then 15 migraine occurrences throughout the month lasting for more than 4 hours that are still refractory to medication. The patient's trigger sites are identified ( Bilateralsupraorbital bilateral temporal, frontal  Recommend 83 units of Botox for continued symptomatic migraines        -  10 Units divided between 2 sites   Procerus- 5 units 1 site  Frontalis - 10 units divided between 4 sites  Temporalis- 48 units divided between 8 sites    Total Dose - 83 units       Migraine log sheet given to patient today for documentation. The patient will likely benefit from continued botulinum toxin injections to their trigger point sites to aid in alleviating the symptoms frequency and intensity and debilitating factors other migraine headaches. Botulinum Toxin Injection Procedure Note:        The risks, benefits and options were discussed with the pt. The risks included but not limited to pain, bleeding, infection, asymmetry,  and need for further procedures. The patient understands that there is a risk for upper eyelid ptosis. There may be a need for touch up injections and follow up at 2 weeks is encouraged. All of Her questions were answered to Her satisfaction and She agrees to proceed with the operation.      The occiput and trapezial area was cleansed with alcohol. Ice was used to numb the area. Botox was agreed upon and reconstituted in a concentration of 1 unit/ 0.01cc with sterile injectable saline.  85units were injected into the areas. There was pinpoint bleeding and local redness. The patient tolerated the procedure well. There was 15 units Botox waste.     The patient was counseled to use ice for the next hour and limit strenuous activity for 24 hours.    The patient's Botox was sent to our office by her pharmacy.    Continue with PO migraine medication PRN.    Follow up in 3 months        LOTTIE Casanova

## 2023-05-04 RX ORDER — ONABOTULINUMTOXINA 100 [USP'U]/1
INJECTION, POWDER, LYOPHILIZED, FOR SOLUTION INTRADERMAL; INTRAMUSCULAR
Qty: 2 EACH | Refills: 0 | Status: SHIPPED | OUTPATIENT
Start: 2023-05-04

## 2023-06-07 ENCOUNTER — OFFICE VISIT (OUTPATIENT)
Dept: SURGERY | Age: 48
End: 2023-06-07
Payer: COMMERCIAL

## 2023-06-07 VITALS — TEMPERATURE: 100.1 F

## 2023-06-07 DIAGNOSIS — G43.919 INTRACTABLE MIGRAINE WITHOUT STATUS MIGRAINOSUS, UNSPECIFIED MIGRAINE TYPE: Primary | ICD-10-CM

## 2023-06-07 PROCEDURE — 64615 CHEMODENERV MUSC MIGRAINE: CPT | Performed by: PHYSICIAN ASSISTANT

## 2023-06-07 NOTE — PROGRESS NOTES
may be a need for touch up injections and follow up at 2 weeks is encouraged. All of Her questions were answered to Her satisfaction and She agrees to proceed with the operation. The occiput and trapezial area was cleansed with alcohol. Ice was used to numb the area. Botox was agreed upon and reconstituted in a concentration of 1 unit/ 0.01cc with sterile injectable saline. 85units were injected into the areas. There was pinpoint bleeding and local redness. The patient tolerated the procedure well. There was 15 units Botox waste. The patient was counseled to use ice for the next hour and limit strenuous activity for 24 hours. The patient's Botox was sent to our office by her pharmacy. Continue with PO migraine medication PRN.     Follow up in 3 months        Javier Shaw

## 2023-08-18 ENCOUNTER — TELEPHONE (OUTPATIENT)
Dept: SURGERY | Age: 48
End: 2023-08-18

## 2023-08-18 NOTE — TELEPHONE ENCOUNTER
Patient is scheduled on 9/11 Monday on [facility] schedule. Note on appt desk says to r/s. Spoke with patient she stated [facility] and [facility] have done the medical botox injections. Please advise if appointment on the 9/11 needs r/s to 's schedule.

## 2023-09-11 ENCOUNTER — OFFICE VISIT (OUTPATIENT)
Dept: SURGERY | Age: 48
End: 2023-09-11
Payer: COMMERCIAL

## 2023-09-11 VITALS — TEMPERATURE: 98.4 F | HEART RATE: 78 BPM | OXYGEN SATURATION: 96 %

## 2023-09-11 DIAGNOSIS — G43.919 INTRACTABLE MIGRAINE WITHOUT STATUS MIGRAINOSUS, UNSPECIFIED MIGRAINE TYPE: Primary | ICD-10-CM

## 2023-09-11 PROCEDURE — 64615 CHEMODENERV MUSC MIGRAINE: CPT | Performed by: PHYSICIAN ASSISTANT

## 2023-09-11 NOTE — PROGRESS NOTES
Medical Botox, 83 units  [address] [id number]  Lot: [id number]  Exp: 2025/09    Bacteriostatic 0.9% Sodium Chloride  NDC 2969-5249-84  Lot: [id number]  Exp: 01 JUL 2024

## 2024-01-09 ENCOUNTER — TELEPHONE (OUTPATIENT)
Dept: SURGERY | Age: 49
End: 2024-01-09

## 2024-01-09 NOTE — TELEPHONE ENCOUNTER
[employer] requested documentation to support Medical Botox treatments. Office mailed initial consult note and last office note to [employer].

## 2024-02-22 ENCOUNTER — TELEPHONE (OUTPATIENT)
Dept: SURGERY | Age: 49
End: 2024-02-22

## 2024-02-22 NOTE — TELEPHONE ENCOUNTER
2/22/2024 office called insurance/[employer] to start new pa for medical botox, per representative insurance has termed 1/13/2024 call reference # [id number]. Office left message with patient to call office, office needs new insurance information: id #, name of plan, who is subscriber, provider services/prior authorization phone number on back of card and address. And what is the specialty pharmacy name and phone number.

## 2024-02-27 NOTE — TELEPHONE ENCOUNTER
Patient provided new plan which is the same she is now the subscriber with new id#. [id number]-fed employee basic plan, enrollment code 112, ID# [id number], no group number.  Provider Services/Medical P# [phone], Specialty pharmacy is [facility] does not dispense botox, P#[phone].This plan is effective 1/14/2024 per Michelle [employer] representative @[phone]. F# [phone] for clinicals. 2/22/24 approved for botox therapeutic spoke to ken Barajas [place] number needed effective to 2/21/2025, if need to call use #[phone].There are 3 possible specialty pharmacies that may dispense with this plan.   [facility] # [phone]  [facility] # [phone]  [facility] [phone]  [facility] and [facility] do not dispense botox on this plan  2/23/24 [facility], spoke to Ileana had to do benefit investigation and submit before  Medication can be approved for shipment to office. Office to call back on Monday 2/26/24. Medication shipped to be delivered on 2/27/2024. No signature required. Office received vials on 2/27/24. Patient pays copays for 2 vials at once, it is cheaper for her.

## 2024-03-11 ENCOUNTER — OFFICE VISIT (OUTPATIENT)
Dept: SURGERY | Age: 49
End: 2024-03-11
Payer: COMMERCIAL

## 2024-03-11 VITALS — WEIGHT: 171.3 LBS | HEART RATE: 70 BPM | BODY MASS INDEX: 26.83 KG/M2 | TEMPERATURE: 98.1 F | OXYGEN SATURATION: 98 %

## 2024-03-11 DIAGNOSIS — G43.919 INTRACTABLE MIGRAINE WITHOUT STATUS MIGRAINOSUS, UNSPECIFIED MIGRAINE TYPE: Primary | ICD-10-CM

## 2024-03-11 PROCEDURE — 64615 CHEMODENERV MUSC MIGRAINE: CPT | Performed by: PHYSICIAN ASSISTANT

## 2024-03-11 NOTE — PROGRESS NOTES
Subjective:    Follow up today from previous injections of Botox for migraine headache. presentation of symptomatic migraine headaches. Denies fever, nausea, vomiting, leg pain or swelling, pain is moderate to severe. The pt states that they have had continued migraine headaches over the past 3 months.  The severity and frequency have decreased prior to beginning Botox.  They state that their migraines are still refractory to prescribed medications (triptans) and ibuprofen.  The patient states they are refractory to both abortive and prophylactic medications.  They present today to review migraine log and plan for continued botulinum toxin injection.  They do not see a neurologist at this time as she states her migraines are worlds better with Botox alone and does not see a reason to follow with her neurologist currently.  She states that the Botox injections have worked so well that she would prefer continuing with Botox and does not want to go back to neurologic treatments such as p.o medications for her migraines with a neurologist.  The patient states that their migraines have improved in severity and overall duration since the initial Botox injection.  She states she enjoyed the amount and location of her last Botox injections and is having continued wonderful results.  She voices no changes since her last office visit.  She states that she has noticed more migraines originate towards the end of her duration of her 3-month regimen of Botox injections at the left frontalis I discussed with her adding a small amount of additional units to this area which she would like to plan.    Objective:    Pulse 70   Temp 98.1 °F (36.7 °C) (Infrared)   Wt 77.7 kg (171 lb 4.8 oz)   LMP 10/02/2020   SpO2 98%   BMI 26.83 kg/m²       Assessment:    CBC:   Lab Results   Component Value Date/Time    WBC 7.2 10/05/2020 01:55 PM    RBC 4.15 10/05/2020 01:55 PM    RBC 4.18 05/31/2019 10:15 AM    HGB 12.1 10/05/2020 01:55 PM

## 2024-06-10 ENCOUNTER — OFFICE VISIT (OUTPATIENT)
Dept: SURGERY | Age: 49
End: 2024-06-10
Payer: COMMERCIAL

## 2024-06-10 VITALS — TEMPERATURE: 97.6 F

## 2024-06-10 DIAGNOSIS — G43.919 INTRACTABLE MIGRAINE WITHOUT STATUS MIGRAINOSUS, UNSPECIFIED MIGRAINE TYPE: Primary | ICD-10-CM

## 2024-06-10 PROCEDURE — NBSRV PR OFFICE/OUTPT VISIT,PROCEDURE ONLY: Performed by: PHYSICIAN ASSISTANT

## 2024-06-10 PROCEDURE — 64615 CHEMODENERV MUSC MIGRAINE: CPT | Performed by: PHYSICIAN ASSISTANT

## 2024-06-10 NOTE — PROGRESS NOTES
Subjective:    Follow up today from previous injections of Botox for migraine headache. presentation of symptomatic migraine headaches. Denies fever, nausea, vomiting, leg pain or swelling, pain is moderate to severe. The pt states that they have had continued migraine headaches over the past 3 months.  The severity and frequency have decreased prior to beginning Botox.  They state that their migraines are still refractory to prescribed medications (triptans) and ibuprofen.  The patient states they are refractory to both abortive and prophylactic medications.  They present today to review migraine log and plan for continued botulinum toxin injection.  They do not see a neurologist at this time as she states her migraines are worlds better with Botox alone and does not see a reason to follow with her neurologist currently.  She states that the Botox injections have worked so well that she would prefer continuing with Botox and does not want to go back to neurologic treatments such as p.o medications for her migraines with a neurologist.  She does bring to my attention that she ultimately may need to have some p.o. medication as she states that approximately 2 weeks ago she started noticing some left upper eyelid droop.  She states this typically occurs towards the end of her Botox injection treatment cycle and is a precursor to having more symptomatic and severe migraines.  The patient states that their migraines have improved in severity and overall duration since the initial Botox injection.  She states she enjoyed the amount and location of her last Botox injections and is having continued wonderful results but believes she may benefit from an additional amount of units to the right forehead this time.  She voices no changes since her last office visit.  She states that she has noticed more migraines originate towards the end of her duration of her 3-month regimen of Botox injections at the left frontalis I

## 2024-09-09 ENCOUNTER — OFFICE VISIT (OUTPATIENT)
Dept: SURGERY | Age: 49
End: 2024-09-09
Payer: COMMERCIAL

## 2024-09-09 VITALS — TEMPERATURE: 97.7 F

## 2024-09-09 DIAGNOSIS — G43.919 INTRACTABLE MIGRAINE WITHOUT STATUS MIGRAINOSUS, UNSPECIFIED MIGRAINE TYPE: Primary | ICD-10-CM

## 2024-09-09 PROCEDURE — 64615 CHEMODENERV MUSC MIGRAINE: CPT | Performed by: PHYSICIAN ASSISTANT

## 2024-12-16 ENCOUNTER — OFFICE VISIT (OUTPATIENT)
Dept: SURGERY | Age: 49
End: 2024-12-16
Payer: COMMERCIAL

## 2024-12-16 VITALS
HEART RATE: 82 BPM | TEMPERATURE: 98.2 F | OXYGEN SATURATION: 99 % | SYSTOLIC BLOOD PRESSURE: 110 MMHG | DIASTOLIC BLOOD PRESSURE: 76 MMHG

## 2024-12-16 DIAGNOSIS — G43.919 INTRACTABLE MIGRAINE WITHOUT STATUS MIGRAINOSUS, UNSPECIFIED MIGRAINE TYPE: Primary | ICD-10-CM

## 2024-12-16 PROCEDURE — 64615 CHEMODENERV MUSC MIGRAINE: CPT | Performed by: PHYSICIAN ASSISTANT

## 2024-12-16 NOTE — PROGRESS NOTES
Subjective:    Follow up today from previous injections of Botox for migraine headache. presentation of symptomatic migraine headaches. Denies fever, nausea, vomiting, leg pain or swelling, pain is moderate to severe. The pt states that they have had continued migraine headaches over the past 3 months.  The severity and frequency have decreased prior to beginning Botox.  They state that their migraines are still refractory to prescribed medications (triptans) and ibuprofen.  The patient states they are refractory to both abortive and prophylactic medications.  They present today to review migraine log and plan for continued botulinum toxin injection.  They do not see a neurologist at this time as she states her migraines are worlds better with Botox alone and does not see a reason to follow with her neurologist currently.  She states that the Botox injections have worked so well that she would still prefer continuing with Botox and does not want to go back to neurologic treatments such as p.o medications for her migraines with a neurologist.  She does bring to my attention that she ultimately may need to have some p.o. medication as she states that approximately 2 weeks ago she started noticing again some left upper eyelid droop.  She states this typically occurs towards the end of her Botox injection treatment cycle and is a precursor to having more symptomatic and severe migraines.  The patient states that their migraines have improved in severity and overall duration since the initial Botox injection.  She states she enjoyed the amount and location of her last Botox injections and is having continued wonderful results but believes she may benefit from an additional amount of units to the right forehead this time.  She voices no changes since her last office visit.  She states that she has noticed more migraines originate towards the end of her duration of her 3-month regimen of Botox injections at the left

## 2024-12-16 NOTE — PROGRESS NOTES
Bacteriostatic 0.9% Sodium Chloride   lot#   [id number]          Expiration:[id number]                  NDC:[id number]

## 2025-03-19 ENCOUNTER — OFFICE VISIT (OUTPATIENT)
Dept: SURGERY | Age: 50
End: 2025-03-19
Payer: COMMERCIAL

## 2025-03-19 VITALS — TEMPERATURE: 97.8 F

## 2025-03-19 DIAGNOSIS — G43.919 INTRACTABLE MIGRAINE WITHOUT STATUS MIGRAINOSUS, UNSPECIFIED MIGRAINE TYPE: Primary | ICD-10-CM

## 2025-03-19 PROCEDURE — 64615 CHEMODENERV MUSC MIGRAINE: CPT | Performed by: PLASTIC SURGERY

## 2025-03-19 RX ORDER — ROSUVASTATIN CALCIUM 40 MG/1
40 TABLET, COATED ORAL EVERY EVENING
COMMUNITY

## 2025-03-19 RX ORDER — PROPRANOLOL HYDROCHLORIDE 10 MG/1
10 TABLET ORAL PRN
COMMUNITY

## 2025-03-19 NOTE — PROGRESS NOTES
Subjective:    Follow up today from previous injections of Botox for migraine headache. presentation of symptomatic migraine headaches. Denies fever, nausea, vomiting, leg pain or swelling, pain is moderate to severe. The pt states that they have had continued migraine headaches over the past 3 months.  The severity and frequency have decreased prior to beginning Botox.  They state that their migraines are still refractory to prescribed medications (triptans) and ibuprofen.  The patient states they are refractory to both abortive and prophylactic medications.  They present today to review migraine log and plan for continued botulinum toxin injection.  They do not see a neurologist at this time as she states her migraines are worlds better with Botox alone and does not see a reason to follow with her neurologist currently.  She states that the Botox injections have worked so well that she would prefer continuing with Botox and does not want to go back to neurologic treatments such as p.o medications for her migraines with a neurologist.  She does bring to my attention that she ultimately may need to have some p.o. medication as she states that approximately 2 weeks ago she started noticing some left upper eyelid droop.  She states this typically occurs towards the end of her Botox injection treatment cycle and is a precursor to having more symptomatic and severe migraines.  The patient states that their migraines have improved in severity and overall duration since the initial Botox injection.  She states she enjoyed the amount and location of her last Botox injections and is having continued wonderful results but believes she may benefit from an additional amount of units to the right forehead this time.  She voices no changes since her last office visit with respect to Botox, but the patient has gotten on additional medications with her primary care doctor which have helped some of the eye droopiness and is hoping

## 2025-06-25 ENCOUNTER — OFFICE VISIT (OUTPATIENT)
Dept: SURGERY | Age: 50
End: 2025-06-25

## 2025-06-25 VITALS
RESPIRATION RATE: 20 BRPM | TEMPERATURE: 97.7 F | SYSTOLIC BLOOD PRESSURE: 110 MMHG | OXYGEN SATURATION: 95 % | HEART RATE: 65 BPM | DIASTOLIC BLOOD PRESSURE: 78 MMHG

## 2025-06-25 DIAGNOSIS — G43.919 INTRACTABLE MIGRAINE WITHOUT STATUS MIGRAINOSUS, UNSPECIFIED MIGRAINE TYPE: Primary | ICD-10-CM

## (undated) DEVICE — VESSEL SEALER EXTEND: Brand: ENDOWRIST

## (undated) DEVICE — SET INST DAVINCI XI ACCESSORIES

## (undated) DEVICE — ANTI-FOG SOLUTION WITH FOAM PAD: Brand: DEVON

## (undated) DEVICE — SCISSORS SURG DIA8MM MPLR CRV ENDOWRIST

## (undated) DEVICE — PAD,NON-ADHERENT,3X8,STERILE,LF,1/PK: Brand: MEDLINE

## (undated) DEVICE — CATHETER,URETHRAL,VINYL,MALE,16",16 FR: Brand: MEDLINE

## (undated) DEVICE — STRIP,CLOSURE,WOUND,MEDI-STRIP,1/2X4: Brand: MEDLINE

## (undated) DEVICE — APPLICATOR MEDICATED 26 CC SOLUTION HI LT ORNG CHLORAPREP

## (undated) DEVICE — TRAY,VAG PREP,2PR VNYL GLV,4 C: Brand: MEDLINE INDUSTRIES, INC.

## (undated) DEVICE — TRAY PROCED DILATATION CURETTAGE

## (undated) DEVICE — PROGRASP FORCEPS: Brand: ENDOWRIST

## (undated) DEVICE — Device: Brand: MEDEX

## (undated) DEVICE — TOTAL TRAY, 16FR 10ML SIL FOLEY, URN: Brand: MEDLINE

## (undated) DEVICE — SMARTGOWN BREATHABLE SURGICAL GOWN: Brand: CONVERTORS

## (undated) DEVICE — BLADELESS OBTURATOR: Brand: WECK VISTA

## (undated) DEVICE — ARM DRAPE

## (undated) DEVICE — MASTISOL ADHESIVE LIQ 2/3ML

## (undated) DEVICE — SYRINGE MED 10ML LUERLOCK TIP W/O SFTY DISP

## (undated) DEVICE — ELECTRODE PT RET AD L9FT HI MOIST COND ADH HYDRGEL CORDED

## (undated) DEVICE — TOWEL,OR,DSP,ST,BLUE,STD,6/PK,12PK/CS: Brand: MEDLINE

## (undated) DEVICE — Z INACTIVE USE 2660664 SOLUTION IRRIG 3000ML 0.9% SOD CHL USP UROMATIC PLAS CONT

## (undated) DEVICE — GAUZE,SPONGE,4"X4",16PLY,XRAY,STRL,LF: Brand: MEDLINE

## (undated) DEVICE — SHEET DRAPE FULL 70X100

## (undated) DEVICE — TIP IU L12CM DIA6.7MM ORNG SFT FLX DST END DISP RUMI II

## (undated) DEVICE — GLOVE ORANGE PI 7 1/2   MSG9075

## (undated) DEVICE — PACK,AURORA,LAVH: Brand: MEDLINE

## (undated) DEVICE — COLUMN DRAPE

## (undated) DEVICE — COVER,LIGHT HANDLE,FLX,1/PK: Brand: MEDLINE INDUSTRIES, INC.

## (undated) DEVICE — DOUBLE BASIN SET: Brand: MEDLINE INDUSTRIES, INC.

## (undated) DEVICE — APPLICATOR SURG XL L38CM FOR ARISTA ABSRB HEMSTAT FLEXITIP

## (undated) DEVICE — [HIGH FLOW INSUFFLATOR,  DO NOT USE IF PACKAGE IS DAMAGED,  KEEP DRY,  KEEP AWAY FROM SUNLIGHT,  PROTECT FROM HEAT AND RADIOACTIVE SOURCES.]: Brand: PNEUMOSURE

## (undated) DEVICE — PAD MATERNITY CURITY ADH STRIP DISP

## (undated) DEVICE — 40586 ADVANCED TRENDELENBURG POSITIONING KIT: Brand: 40586 ADVANCED TRENDELENBURG POSITIONING KIT

## (undated) DEVICE — SYRINGE MED 50ML LUERLOCK TIP

## (undated) DEVICE — ELECTRO LUBE IS A SINGLE PATIENT USE DEVICE THAT IS INTENDED TO BE USED ON ELECTROSURGICAL ELECTRODES TO REDUCE STICKING.: Brand: KEY SURGICAL ELECTRO LUBE

## (undated) DEVICE — PUMP SUC IRR TBNG L10FT W/ HNDPC ASSEMB STRYKEFLOW 2

## (undated) DEVICE — TROCAR: Brand: KII FIOS FIRST ENTRY

## (undated) DEVICE — CAMERA STRYKER 1488 HD GEN

## (undated) DEVICE — AGENT HEMSTAT 3GM PURIFIED PLNT STARCH PWD ABSRB ARISTA AH

## (undated) DEVICE — MICRO TIP WIPE: Brand: DEVON

## (undated) DEVICE — PACK PROC 3IN1 W/ L12FT DIA0.25IN REINF SUCT TBNG W50XL901IN

## (undated) DEVICE — SET RUMI WITH RESIN KOH CUPS

## (undated) DEVICE — KIT EXTN LN IV CONDUCT FLUIDS FOR GRAVITATIONAL IV ADMIN SGL

## (undated) DEVICE — INSUFFLATION NEEDLE TO ESTABLISH PNEUMOPERITONEUM.: Brand: INSUFFLATION NEEDLE

## (undated) DEVICE — MARKER,SKIN,WI/RULER AND LABELS: Brand: MEDLINE

## (undated) DEVICE — CANNULA SEAL

## (undated) DEVICE — SYRINGE IRRIG 60ML SFT PLIABLE BLB EZ TO GRP 1 HND USE W/

## (undated) DEVICE — GOWN,SIRUS,NONRNF,SETINSLV,XL,20/CS: Brand: MEDLINE

## (undated) DEVICE — GARMENT,MEDLINE,DVT,INT,CALF,MED, GEN2: Brand: MEDLINE

## (undated) DEVICE — SYSTEM ES CUP DIA3.5CM PNEUMO OCCL BLLN DISP FOR CLIN POS

## (undated) DEVICE — TIP COVER ACCESSORY

## (undated) DEVICE — SET SURG INSTR DISSECT

## (undated) DEVICE — Device: Brand: INSTRUSAFE

## (undated) DEVICE — SCOPE DAVINCI XI 0 DEG W/CORD

## (undated) DEVICE — TRAY PROCED HYSTEROSCOPY CIRCON 1

## (undated) DEVICE — SOLUTION IV IRRIG POUR BRL 0.9% SODIUM CHL 2F7124

## (undated) DEVICE — LENS CORD GYN 0-DEG 5 MM CIRCON

## (undated) DEVICE — MEGA SUTURECUT ND: Brand: ENDOWRIST

## (undated) DEVICE — PLUMEPORT LAPAROSCOPIC SMOKE FILTRATION DEVICE: Brand: PLUMEPORT ACTIV

## (undated) DEVICE — NDL CNTR 40CT FM MAG: Brand: MEDLINE INDUSTRIES, INC.

## (undated) DEVICE — INTENDED FOR TISSUE SEPARATION, AND OTHER PROCEDURES THAT REQUIRE A SHARP SURGICAL BLADE TO PUNCTURE OR CUT.: Brand: BARD-PARKER ® STAINLESS STEEL BLADES

## (undated) DEVICE — CYSTO/BLADDER IRRIGATION SET, REGULATING CLAMP

## (undated) DEVICE — SUTURE ABSRB L6IN L37MM 0 GS-21 GRN 1/2 CIR TAPR PNT NDL VLOCL0306